# Patient Record
Sex: FEMALE | Race: WHITE | URBAN - METROPOLITAN AREA
[De-identification: names, ages, dates, MRNs, and addresses within clinical notes are randomized per-mention and may not be internally consistent; named-entity substitution may affect disease eponyms.]

---

## 2023-04-25 ENCOUNTER — OFFICE VISIT (OUTPATIENT)
Dept: NEUROLOGY | Age: 53
End: 2023-04-25
Payer: COMMERCIAL

## 2023-04-25 DIAGNOSIS — R56.9 SEIZURE (HCC): Primary | ICD-10-CM

## 2023-04-25 PROCEDURE — 95816 EEG AWAKE AND DROWSY: CPT | Performed by: PSYCHIATRY & NEUROLOGY

## 2023-04-25 NOTE — PROCEDURES
[facility] AT [place]   Electroencephalogram Report    Procedure ID: [id number] Procedure Date: 4/25/2023   Patient Name: Leanna Robertson YOB: 1970   Procedure Type: Sleep Deprived Medical Record No: [id number]       A sleep deprived EEG is requested in this 49-year-old lady to evaluate for epileptiform abnormalities. Her medications are said to include Trileptal.    This tracing is obtained during the awake and drowsy states. During wakefulness there are intermittent runs of posteriorly dominant and symmetric low to medium amplitude 10 cps activities which attenuate with eye opening. Lower voltage faster frequency activities are seen symmetrically over the anterior head regions. Hyperventilation is performed for 3 minutes and little alters the tracing. Intermittent photic stimulation induces symmetric posterior driving responses. During drowsiness the background rhythms attenuate and replaced with diffuse symmetric theta range activities. There is the later emergence of symmetric vertex sharp waves. Later stages of sleep were not attained.     Interpretation  This sleep deprived EEG recorded during the awake and drowsy states is normal.      Sasha Espinoza MD

## 2023-05-15 ENCOUNTER — OFFICE VISIT (OUTPATIENT)
Age: 53
End: 2023-05-15
Payer: COMMERCIAL

## 2023-05-15 VITALS — OXYGEN SATURATION: 98 % | SYSTOLIC BLOOD PRESSURE: 136 MMHG | HEART RATE: 68 BPM | DIASTOLIC BLOOD PRESSURE: 84 MMHG

## 2023-05-15 DIAGNOSIS — R56.9 CONVULSIONS, UNSPECIFIED CONVULSION TYPE (HCC): Primary | ICD-10-CM

## 2023-05-15 DIAGNOSIS — F41.9 ANXIETY: ICD-10-CM

## 2023-05-15 PROCEDURE — 99214 OFFICE O/P EST MOD 30 MIN: CPT | Performed by: NURSE PRACTITIONER

## 2023-05-15 NOTE — PROGRESS NOTES
Jadiel Corral is a 45 y.o. female who presents with the following  Chief Complaint   Patient presents with    Follow-up    Results       HPI    FU SD EEG   Doing well   Off Trileptal X 1 month now   Feels a lot better  Memory is better  Cognitive ability better  They feel she is becoming herself again   No seizures, jerking, twitching, convulsions  Notes from [facility] evaluated and placed in media. No concerns  Does want to get in with come counseling. No Known Allergies    No current outpatient medications on file. No current facility-administered medications for this visit. Social History     Tobacco Use   Smoking Status Not on file   Smokeless Tobacco Not on file       No past medical history on file. No past surgical history on file. No family history on file. Social History     Socioeconomic History    Marital status: Single       Review of Systems      Remainder of comprehensive review is negative. Physical Exam :    /84 (Site: Left Upper Arm, Position: Sitting, Cuff Size: Large Adult)   Pulse 68   SpO2 98%   MRI Result (most recent):  No results found for this or any previous visit from the past 3650 days. CT Result (most recent):  No results found for this or any previous visit from the past 3650 days. EEG Result:      Carotid Doppler:        Recent Labs:  No results found for: WBC, HGB, HCT, MCV, PLT  No results found for: NA, K, CL, CO2, BUN, CREATININE, GLUCOSE, CALCIUM, PROT, LABALBU, BILITOT, ALKPHOS, AST, ALT, LABGLOM, GFRAA, AGRATIO, GLOB    No results found for: CHOL  No results found for: TRIG  No results found for: HDL  No results found for: LDLCHOLESTEROL, LDLCALC  No results found for: LABVLDL, VLDL  No results found for: CHOLHDLRATIO    No results found for: SEDRATE    No results found for: LABA1C  No results found for: SAMY             1. Convulsions, unspecified convulsion type [facility])  -     External Referral To Psychology  2.

## 2023-05-15 NOTE — PROGRESS NOTES
Pt has been doing really well, seizure free  Hasnt been on any meds since SD EEG  Asking for ref with psychiatrist, discussed with [place]

## 2023-05-17 ENCOUNTER — TELEPHONE (OUTPATIENT)
Age: 53
End: 2023-05-17

## 2023-05-17 NOTE — TELEPHONE ENCOUNTER
LVM, advised pt to contact insurance company to find out which psychiatrist is within their network and she may schedule with them.

## 2023-05-17 NOTE — TELEPHONE ENCOUNTER
Patient stated that at her last visit looking at her summary paperwork the referral did not have the Psychologist information and she would like to make her appointment.     Please contact

## 2023-05-19 ENCOUNTER — TELEPHONE (OUTPATIENT)
Age: 53
End: 2023-05-19

## 2023-08-30 ENCOUNTER — OFFICE VISIT (OUTPATIENT)
Age: 53
End: 2023-08-30
Payer: COMMERCIAL

## 2023-08-30 VITALS
DIASTOLIC BLOOD PRESSURE: 64 MMHG | SYSTOLIC BLOOD PRESSURE: 124 MMHG | RESPIRATION RATE: 16 BRPM | OXYGEN SATURATION: 93 % | HEART RATE: 72 BPM

## 2023-08-30 DIAGNOSIS — R56.9 SEIZURE (HCC): ICD-10-CM

## 2023-08-30 DIAGNOSIS — R44.0 AUDITORY HALLUCINATION: Primary | ICD-10-CM

## 2023-08-30 PROCEDURE — 99213 OFFICE O/P EST LOW 20 MIN: CPT | Performed by: PSYCHIATRY & NEUROLOGY

## 2023-08-30 RX ORDER — LEVONORGESTREL 52 MG/1
INTRAUTERINE DEVICE INTRAUTERINE
COMMUNITY

## 2023-08-30 NOTE — PROGRESS NOTES
upset that this is happening. She just wants to understand why and then address it. Examination  /64   Pulse 72   Resp 16   SpO2 93%   She is a pleasant engaging lady. She is tearful. No ataxia    Impression/Plan  Isolated seizure without recurrence  Continue to monitor off antiseizure medications  New issue of auditory hallucination versus just misperception of normal sounds that she is hearing which are quite upsetting to her  She has a neuropsychological consultation set for January and we will try to see if we can expedite that in some fashion    Becky Jj MD        This note was created using voice recognition software. Despite editing, there may be syntax errors.

## 2023-09-18 ENCOUNTER — TELEMEDICINE (OUTPATIENT)
Age: 53
End: 2023-09-18

## 2023-09-18 DIAGNOSIS — R41.89 COGNITIVE CHANGE: Primary | ICD-10-CM

## 2023-09-27 ENCOUNTER — TELEMEDICINE (OUTPATIENT)
Age: 53
End: 2023-09-27
Payer: COMMERCIAL

## 2023-09-27 DIAGNOSIS — F41.9 ANXIETY: ICD-10-CM

## 2023-09-27 DIAGNOSIS — Z91.49 HISTORY OF PSYCHOLOGICAL TRAUMA: ICD-10-CM

## 2023-09-27 DIAGNOSIS — R47.89 WORD FINDING DIFFICULTY: ICD-10-CM

## 2023-09-27 DIAGNOSIS — R44.0 AUDITORY HALLUCINATIONS: ICD-10-CM

## 2023-09-27 DIAGNOSIS — R41.840 ATTENTION AND CONCENTRATION DEFICIT: ICD-10-CM

## 2023-09-27 DIAGNOSIS — R41.3 MEMORY LOSS: Primary | ICD-10-CM

## 2023-09-27 DIAGNOSIS — R41.9 DEFICIT IN COMPREHENSION: ICD-10-CM

## 2023-09-27 PROCEDURE — 90791 PSYCH DIAGNOSTIC EVALUATION: CPT | Performed by: PSYCHOLOGIST

## 2023-09-27 NOTE — PROGRESS NOTES
when appropriate. The patient was located at Home: [address] [place] [address].   [address] [facility] [address]  Provider was located at [facility] ([facility]): [place]

## 2023-10-19 SDOH — HEALTH STABILITY: PHYSICAL HEALTH: ON AVERAGE, HOW MANY MINUTES DO YOU ENGAGE IN EXERCISE AT THIS LEVEL?: 20 MIN

## 2023-10-19 SDOH — HEALTH STABILITY: PHYSICAL HEALTH: ON AVERAGE, HOW MANY DAYS PER WEEK DO YOU ENGAGE IN MODERATE TO STRENUOUS EXERCISE (LIKE A BRISK WALK)?: 3 DAYS

## 2023-10-20 ENCOUNTER — OFFICE VISIT (OUTPATIENT)
Age: 53
End: 2023-10-20
Payer: COMMERCIAL

## 2023-10-20 VITALS
HEIGHT: 62 IN | WEIGHT: 205.13 LBS | BODY MASS INDEX: 37.75 KG/M2 | HEART RATE: 62 BPM | DIASTOLIC BLOOD PRESSURE: 77 MMHG | SYSTOLIC BLOOD PRESSURE: 127 MMHG | OXYGEN SATURATION: 97 %

## 2023-10-20 DIAGNOSIS — F41.9 ANXIETY: ICD-10-CM

## 2023-10-20 DIAGNOSIS — F43.10 PTSD (POST-TRAUMATIC STRESS DISORDER): ICD-10-CM

## 2023-10-20 DIAGNOSIS — N92.0 MENORRHAGIA WITH REGULAR CYCLE: ICD-10-CM

## 2023-10-20 DIAGNOSIS — R56.9 SEIZURE (HCC): Primary | ICD-10-CM

## 2023-10-20 PROCEDURE — 99203 OFFICE O/P NEW LOW 30 MIN: CPT | Performed by: STUDENT IN AN ORGANIZED HEALTH CARE EDUCATION/TRAINING PROGRAM

## 2023-10-20 SDOH — ECONOMIC STABILITY: FOOD INSECURITY: WITHIN THE PAST 12 MONTHS, THE FOOD YOU BOUGHT JUST DIDN'T LAST AND YOU DIDN'T HAVE MONEY TO GET MORE.: NEVER TRUE

## 2023-10-20 SDOH — ECONOMIC STABILITY: FOOD INSECURITY: WITHIN THE PAST 12 MONTHS, YOU WORRIED THAT YOUR FOOD WOULD RUN OUT BEFORE YOU GOT MONEY TO BUY MORE.: NEVER TRUE

## 2023-10-20 SDOH — ECONOMIC STABILITY: INCOME INSECURITY: HOW HARD IS IT FOR YOU TO PAY FOR THE VERY BASICS LIKE FOOD, HOUSING, MEDICAL CARE, AND HEATING?: NOT HARD AT ALL

## 2023-10-20 SDOH — ECONOMIC STABILITY: HOUSING INSECURITY
IN THE LAST 12 MONTHS, WAS THERE A TIME WHEN YOU DID NOT HAVE A STEADY PLACE TO SLEEP OR SLEPT IN A SHELTER (INCLUDING NOW)?: NO

## 2023-11-17 ENCOUNTER — PROCEDURE VISIT (OUTPATIENT)
Age: 53
End: 2023-11-17
Payer: COMMERCIAL

## 2023-11-17 DIAGNOSIS — F43.10 PTSD (POST-TRAUMATIC STRESS DISORDER): ICD-10-CM

## 2023-11-17 DIAGNOSIS — F41.8 ANXIETY ABOUT HEALTH: ICD-10-CM

## 2023-11-17 DIAGNOSIS — H93.25 AUDITORY PROCESSING DISORDER: Primary | ICD-10-CM

## 2023-11-17 DIAGNOSIS — F41.9 ANXIETY DISORDER, UNSPECIFIED TYPE: ICD-10-CM

## 2023-11-17 DIAGNOSIS — F34.1 PERSISTENT DEPRESSIVE DISORDER: ICD-10-CM

## 2023-11-17 PROCEDURE — 96138 PSYCL/NRPSYC TECH 1ST: CPT | Performed by: PSYCHOLOGIST

## 2023-11-17 PROCEDURE — 96136 PSYCL/NRPSYC TST PHY/QHP 1ST: CPT | Performed by: PSYCHOLOGIST

## 2023-11-17 PROCEDURE — 96133 NRPSYC TST EVAL PHYS/QHP EA: CPT | Performed by: PSYCHOLOGIST

## 2023-11-17 PROCEDURE — 96139 PSYCL/NRPSYC TST TECH EA: CPT | Performed by: PSYCHOLOGIST

## 2023-11-17 PROCEDURE — 96137 PSYCL/NRPSYC TST PHY/QHP EA: CPT | Performed by: PSYCHOLOGIST

## 2023-11-17 PROCEDURE — 96132 NRPSYC TST EVAL PHYS/QHP 1ST: CPT | Performed by: PSYCHOLOGIST

## 2023-11-17 NOTE — PROGRESS NOTES
[facility] Neurology  [facility] [address], [address]  Office:  [phone]  Fax: [phone]                  Neuropsychological Evaluation Report    Patient Name: Brie Daniel  Age: 45 y.o. Gender: female   Handedness: left handed   Presenting Concern: memory loss, attention and concentration deficits, word finding, comprehension; anxiety   Primary Care Physician: Ashley Lopez MD  Referring Provider: Rosio Yepez MD    PATIENT HISTORY (OBTAINED DURING INITIAL CLINICAL EVALUATION):    REASON FOR REFERRAL:  This comprehensive and medically necessary neuropsychological assessment was requested to assist a differential diagnosis of cognitive and psychological concerns. The use and purpose of this examination, as well as the extent and limitations of confidentiality, were explained prior to obtaining permission to participate. Instructions were provided regarding the necessity to put forth optimal effort and answer questions truthfully in order to obtain reliable and accurate test results. REVIEW OF RECORDS:  Ms. Hussain Loza was referred by neurology where she is followed for a seizure which occurred on 4/2/22. She was said to have an abnormal EEG and was diagnosed with left-sided temporal lobe epilepsy. A sleep deprived EEG was unremarkable. A neuropsychological evaluation has been requested due to auditory hallucinations. For example, Ms. Hussain Loza hears the toilet flushing when no one is home. Anxiety is also noted. An MRI of the brain on 5/27/22, ordered for seizure disorder, showed the following:  No acute intracranial process. No evidence for mesial temporal   sclerosis, cortical dysplasia, or other neuronal migrational   abnormalities. Current Outpatient Medications   Medication Sig Dispense Refill    levonorgestrel (MIRENA, 52 MG,) IUD 52 mg by IntraUTERine route       No current facility-administered medications for this visit.                No data to display

## 2023-11-27 NOTE — PROGRESS NOTES
Interactive Psychotherapy/office feedback        Interactive office feedback session with Ms. Silvio Padilla. I reviewed the results of the recent Neuropsychological Evaluation  including the observed areas of neurocognitive strengths and weaknesses. Education was provided regarding my diagnostic impressions, and treatment plan/options were discussed. I also answered numerous questions related to the clinical findings, including the various methods to improve cognition and mood. CBT, psychoeducation, and supportive psychotherapy techniques were utilized. [facility]'s report [facility] per patient request.         Prior to seeing the patient I reviewed the records, including the previously completed report, the records in [place], and any updated visits from other providers since I saw the patient last.       Diagnoses:      R/O Auditory Processing Disorder versus ADHD versus Other  PTSD  Persistent Depressive Disorder  Anxiety about health R/O Somatization   Anxiety Disorder, unspecified R/O Generalized Anxiety Disorder   History of Panic Attacks  Psychosocial Stress     The patient will follow up with the referring provider, and reported being very pleased with the services provided. Follow up with [facility] 12 months. [id number] psychotherapy 30 Minutes      This note was created using voice recognition software. Despite editing, there may be syntax errors. Harsh Calderon, was evaluated through a synchronous (real-time) audio-video encounter. The patient (or guardian if applicable) is aware that this is a billable service, which includes applicable co-pays. This Virtual Visit was conducted with patient's (and/or legal guardian's) consent. Patient identification was verified, and a caregiver was present when appropriate. The patient was located at Home: [address] [place] [address].   [address] [id number]  Provider was located at [facility] ([facility]): [address] [place] [address]

## 2023-12-01 ENCOUNTER — TELEMEDICINE (OUTPATIENT)
Age: 53
End: 2023-12-01

## 2023-12-01 DIAGNOSIS — Z65.8 PSYCHOSOCIAL DISTRESS: ICD-10-CM

## 2023-12-01 DIAGNOSIS — F41.9 ANXIETY DISORDER, UNSPECIFIED TYPE: ICD-10-CM

## 2023-12-01 DIAGNOSIS — F41.8 ANXIETY ABOUT HEALTH: ICD-10-CM

## 2023-12-01 DIAGNOSIS — F43.10 PTSD (POST-TRAUMATIC STRESS DISORDER): ICD-10-CM

## 2023-12-01 DIAGNOSIS — Z86.59 HISTORY OF PANIC ATTACKS: ICD-10-CM

## 2023-12-01 DIAGNOSIS — H93.25 AUDITORY PROCESSING DISORDER: Primary | ICD-10-CM

## 2023-12-01 DIAGNOSIS — F34.1 PERSISTENT DEPRESSIVE DISORDER: ICD-10-CM

## 2024-01-08 ENCOUNTER — OFFICE VISIT (OUTPATIENT)
Age: 54
End: 2024-01-08
Payer: COMMERCIAL

## 2024-01-08 VITALS
RESPIRATION RATE: 18 BRPM | HEART RATE: 70 BPM | BODY MASS INDEX: 38.99 KG/M2 | SYSTOLIC BLOOD PRESSURE: 136 MMHG | OXYGEN SATURATION: 96 % | DIASTOLIC BLOOD PRESSURE: 73 MMHG | WEIGHT: 211.86 LBS | HEIGHT: 62 IN | TEMPERATURE: 98.2 F

## 2024-01-08 DIAGNOSIS — L24.9 IRRITANT DERMATITIS: ICD-10-CM

## 2024-01-08 DIAGNOSIS — F41.9 ANXIETY: Primary | ICD-10-CM

## 2024-01-08 PROCEDURE — 99213 OFFICE O/P EST LOW 20 MIN: CPT

## 2024-01-08 RX ORDER — BUSPIRONE HYDROCHLORIDE 5 MG/1
5 TABLET ORAL 2 TIMES DAILY
Qty: 60 TABLET | Refills: 0 | Status: SHIPPED | OUTPATIENT
Start: 2024-01-08 | End: 2024-02-07

## 2024-01-08 ASSESSMENT — ANXIETY QUESTIONNAIRES
7. FEELING AFRAID AS IF SOMETHING AWFUL MIGHT HAPPEN: 3
1. FEELING NERVOUS, ANXIOUS, OR ON EDGE: 3
6. BECOMING EASILY ANNOYED OR IRRITABLE: 2
4. TROUBLE RELAXING: 3
5. BEING SO RESTLESS THAT IT IS HARD TO SIT STILL: 2
GAD7 TOTAL SCORE: 19
3. WORRYING TOO MUCH ABOUT DIFFERENT THINGS: 3
IF YOU CHECKED OFF ANY PROBLEMS ON THIS QUESTIONNAIRE, HOW DIFFICULT HAVE THESE PROBLEMS MADE IT FOR YOU TO DO YOUR WORK, TAKE CARE OF THINGS AT HOME, OR GET ALONG WITH OTHER PEOPLE: SOMEWHAT DIFFICULT
2. NOT BEING ABLE TO STOP OR CONTROL WORRYING: 3

## 2024-01-08 ASSESSMENT — PATIENT HEALTH QUESTIONNAIRE - PHQ9
SUM OF ALL RESPONSES TO PHQ9 QUESTIONS 1 & 2: 2
7. TROUBLE CONCENTRATING ON THINGS, SUCH AS READING THE NEWSPAPER OR WATCHING TELEVISION: 1
6. FEELING BAD ABOUT YOURSELF - OR THAT YOU ARE A FAILURE OR HAVE LET YOURSELF OR YOUR FAMILY DOWN: 2
2. FEELING DOWN, DEPRESSED OR HOPELESS: 1
3. TROUBLE FALLING OR STAYING ASLEEP: 2
10. IF YOU CHECKED OFF ANY PROBLEMS, HOW DIFFICULT HAVE THESE PROBLEMS MADE IT FOR YOU TO DO YOUR WORK, TAKE CARE OF THINGS AT HOME, OR GET ALONG WITH OTHER PEOPLE: 2
SUM OF ALL RESPONSES TO PHQ QUESTIONS 1-9: 10
5. POOR APPETITE OR OVEREATING: 1
SUM OF ALL RESPONSES TO PHQ QUESTIONS 1-9: 10
4. FEELING TIRED OR HAVING LITTLE ENERGY: 2
9. THOUGHTS THAT YOU WOULD BE BETTER OFF DEAD, OR OF HURTING YOURSELF: 0
SUM OF ALL RESPONSES TO PHQ QUESTIONS 1-9: 10
8. MOVING OR SPEAKING SO SLOWLY THAT OTHER PEOPLE COULD HAVE NOTICED. OR THE OPPOSITE, BEING SO FIGETY OR RESTLESS THAT YOU HAVE BEEN MOVING AROUND A LOT MORE THAN USUAL: 0
1. LITTLE INTEREST OR PLEASURE IN DOING THINGS: 1
SUM OF ALL RESPONSES TO PHQ QUESTIONS 1-9: 10

## 2024-01-08 NOTE — PROGRESS NOTES
[address] [place] [address]   Office [phone], Fax [phone]  Chief Complaint   Patient presents with    Follow-up      Subjective   Neda Canela is an 53 y.o. female who presents for follow up of anxiety/depression. Started on Buspar at last visit. Reports improvement in mind racing and some somatic symptom improvement. Still has upper back tension. Her partner tells her she still has anxiety 'moments' but they have been less intense and less frequent. Feels like the effects wear off around 4-5pm. Still doing weekly counseling. This has been helping.     She notes a rash that developed on her neck and chest 1 week after starting Buspar but states that is has somewhat improved. She also recently tried several new perfumes and lotions that she was gifted so she is trying to cut these things out of her routine.    Review of Systems   Review of Systems See HPI    Allergies   No Known Allergies    Medications  Current Outpatient Medications   Medication Sig    busPIRone (BUSPAR) 5 MG tablet Take 1 tablet by mouth 2 times daily    levonorgestrel (MIRENA, 52 MG,) IUD 52 mg by IntraUTERine route     No current facility-administered medications for this visit.       Medical History  Past Medical History:   Diagnosis Date    Anxiety     Previously tried medications (zoloft, wellbutrin, and other ones) but they made her more deppressed and suicidal    Gestational diabetes 1998    Heavy menstrual bleeding 1998    Currently on Mirena, which helps a lot, due to come out on 2025, establishing with OBGYN next month    PTSD (post-traumatic stress disorder)     Related to unfortunate relationships. Currently getting counseling (Alfred Padilla) which she finds more helpful than medication    Seizure (HCC) 2022    Follows with Dr. Boykin, was previously on trileptal but not currently on any medications       Immunizations     There is no immunization history on file for this patient.    Objective   Vital

## 2024-01-08 NOTE — PROGRESS NOTES
Rash after a week of Buspar    Identified pt with two pt identifiers(name and ). Reviewed record in preparation for visit and have obtained necessary documentation.  Chief Complaint   Patient presents with    Follow-up        Health Maintenance Due   Topic    Hepatitis B vaccine (1 of 3 - 3-dose series)    COVID-19 Vaccine (1)    Depression Monitoring     HIV screen     Hepatitis C screen     DTaP/Tdap/Td vaccine (1 - Tdap)    Cervical cancer screen     Diabetes screen     Lipids     Colorectal Cancer Screen     Breast cancer screen     Shingles vaccine (1 of 2)    Low dose CT lung screening &/or counseling     Flu vaccine (1)       Vitals:    24 1456 24 1503   BP: (!) 142/78 136/73   Site: Right Upper Arm Left Upper Arm   Position: Sitting Sitting   Cuff Size: Large Adult Large Adult   Pulse: 70    Resp: 18    Temp: 98.2 °F (36.8 °C)    TempSrc: Oral    SpO2: 96%    Weight: 96.1 kg (211 lb 13.8 oz)    Height: 1.575 m (5' 2\")            Coordination of Care Questionnaire:  :   1. Have you been to the ER, urgent care clinic since your last visit?  Hospitalized since your last visit?No    2. Have you seen or consulted any other health care providers outside of the [facility] since your last visit?  Include any pap smears or colon screening. No    This patient is accompanied in the office by her self.  I have received verbal consent from Neda Canela to discuss any/all medical information while they are present in the room.

## 2024-02-06 ENCOUNTER — OFFICE VISIT (OUTPATIENT)
Age: 54
End: 2024-02-06
Payer: COMMERCIAL

## 2024-02-06 VITALS
WEIGHT: 211 LBS | BODY MASS INDEX: 38.83 KG/M2 | SYSTOLIC BLOOD PRESSURE: 137 MMHG | HEART RATE: 78 BPM | DIASTOLIC BLOOD PRESSURE: 74 MMHG | HEIGHT: 62 IN | OXYGEN SATURATION: 95 % | RESPIRATION RATE: 16 BRPM | TEMPERATURE: 97.7 F

## 2024-02-06 DIAGNOSIS — L70.0 ACNE VULGARIS: ICD-10-CM

## 2024-02-06 DIAGNOSIS — F43.10 PTSD (POST-TRAUMATIC STRESS DISORDER): ICD-10-CM

## 2024-02-06 DIAGNOSIS — F41.8 ANXIETY WITH SOMATIC FEATURES: Primary | ICD-10-CM

## 2024-02-06 PROCEDURE — 99214 OFFICE O/P EST MOD 30 MIN: CPT

## 2024-02-06 RX ORDER — BUSPIRONE HYDROCHLORIDE 7.5 MG/1
7.5 TABLET ORAL 2 TIMES DAILY
Qty: 60 TABLET | Refills: 0 | Status: SHIPPED | OUTPATIENT
Start: 2024-02-06 | End: 2024-03-07

## 2024-02-06 RX ORDER — BUSPIRONE HYDROCHLORIDE 5 MG/1
5 TABLET ORAL 2 TIMES DAILY
Qty: 60 TABLET | Refills: 0 | Status: CANCELLED | OUTPATIENT
Start: 2024-02-06 | End: 2024-03-07

## 2024-02-06 ASSESSMENT — PATIENT HEALTH QUESTIONNAIRE - PHQ9
8. MOVING OR SPEAKING SO SLOWLY THAT OTHER PEOPLE COULD HAVE NOTICED. OR THE OPPOSITE, BEING SO FIGETY OR RESTLESS THAT YOU HAVE BEEN MOVING AROUND A LOT MORE THAN USUAL: 0
SUM OF ALL RESPONSES TO PHQ QUESTIONS 1-9: 8
6. FEELING BAD ABOUT YOURSELF - OR THAT YOU ARE A FAILURE OR HAVE LET YOURSELF OR YOUR FAMILY DOWN: 1
1. LITTLE INTEREST OR PLEASURE IN DOING THINGS: 1
4. FEELING TIRED OR HAVING LITTLE ENERGY: 1
7. TROUBLE CONCENTRATING ON THINGS, SUCH AS READING THE NEWSPAPER OR WATCHING TELEVISION: 2
9. THOUGHTS THAT YOU WOULD BE BETTER OFF DEAD, OR OF HURTING YOURSELF: 0
SUM OF ALL RESPONSES TO PHQ QUESTIONS 1-9: 8
SUM OF ALL RESPONSES TO PHQ9 QUESTIONS 1 & 2: 2
2. FEELING DOWN, DEPRESSED OR HOPELESS: 1
5. POOR APPETITE OR OVEREATING: 1
3. TROUBLE FALLING OR STAYING ASLEEP: 1
10. IF YOU CHECKED OFF ANY PROBLEMS, HOW DIFFICULT HAVE THESE PROBLEMS MADE IT FOR YOU TO DO YOUR WORK, TAKE CARE OF THINGS AT HOME, OR GET ALONG WITH OTHER PEOPLE: 0

## 2024-02-06 ASSESSMENT — ANXIETY QUESTIONNAIRES
5. BEING SO RESTLESS THAT IT IS HARD TO SIT STILL: 1
2. NOT BEING ABLE TO STOP OR CONTROL WORRYING: 2
3. WORRYING TOO MUCH ABOUT DIFFERENT THINGS: 2
1. FEELING NERVOUS, ANXIOUS, OR ON EDGE: 1
4. TROUBLE RELAXING: 1
6. BECOMING EASILY ANNOYED OR IRRITABLE: 0
7. FEELING AFRAID AS IF SOMETHING AWFUL MIGHT HAPPEN: 1
GAD7 TOTAL SCORE: 8
IF YOU CHECKED OFF ANY PROBLEMS ON THIS QUESTIONNAIRE, HOW DIFFICULT HAVE THESE PROBLEMS MADE IT FOR YOU TO DO YOUR WORK, TAKE CARE OF THINGS AT HOME, OR GET ALONG WITH OTHER PEOPLE: SOMEWHAT DIFFICULT

## 2024-02-06 NOTE — PROGRESS NOTES
Patient has been identified by name and .    Chief Complaint   Patient presents with    Anxiety     Follow up       Vitals:    24 1307   BP: 137/74   Site: Right Upper Arm   Position: Sitting   Cuff Size: Large Adult   Pulse: 78   Resp: 16   Temp: 97.7 °F (36.5 °C)   TempSrc: Oral   SpO2: 95%   Weight: 95.7 kg (211 lb)   Height: 1.575 m (5' 2\")        1. Have you been to the ER, urgent care clinic since your last visit?  Hospitalized since your last visit?No    2. Have you seen or consulted any other health care providers outside of the [facility] since your last visit?  Include any pap smears or colon screening. No

## 2024-02-06 NOTE — PROGRESS NOTES
[address] [place] [address]   Office [phone], Fax [phone]  Chief Complaint   Patient presents with    Anxiety     Follow up      Subjective   Neda Canela is an 53 y.o. female who presents for anxiety follow up. Patient reports she is doing better. Continuing Buspar and has not had any issues. Still has mind racing but feels frequency of \"intense moments\" have decreased. Working with therapist to redirect thoughts when her PTSD is triggered. Somatic symptoms/ flank pain has improved. Now feels like she may be able to go back to work at some point. Interested in increasing Buspar dose.     Recently having acne on face. So far has tried salicylic acid face wash. Had hormonal testing done at her GYN and everything was normal.     Review of Systems   Review of Systems See HPI    Allergies   No Known Allergies    Medications  Current Outpatient Medications   Medication Sig    busPIRone (BUSPAR) 7.5 MG tablet Take 1 tablet by mouth 2 times daily    levonorgestrel (MIRENA, 52 MG,) IUD 52 mg by IntraUTERine route     No current facility-administered medications for this visit.       Medical History  Past Medical History:   Diagnosis Date    Anxiety     Previously tried medications (zoloft, wellbutrin, and other ones) but they made her more deppressed and suicidal    Gestational diabetes 1998    Heavy menstrual bleeding 1998    Currently on Mirena, which helps a lot, due to come out on 2025, establishing with OBGYN next month    PTSD (post-traumatic stress disorder)     Related to unfortunate relationships. Currently getting counseling (Alfred Padilla) which she finds more helpful than medication    Seizure (HCC) 2022    Follows with Dr. Boykin, was previously on trileptal but not currently on any medications       Immunizations     There is no immunization history on file for this patient.    Objective   Vital Signs  /74 (Site: Right Upper Arm, Position: Sitting, Cuff Size: Large Adult)

## 2024-02-13 ENCOUNTER — TELEPHONE (OUTPATIENT)
Age: 54
End: 2024-02-13

## 2024-02-13 RX ORDER — BUSPIRONE HYDROCHLORIDE 5 MG/1
7.5 TABLET ORAL 2 TIMES DAILY
Qty: 90 TABLET | Refills: 2 | Status: SHIPPED | OUTPATIENT
Start: 2024-02-13 | End: 2024-05-13

## 2024-02-13 NOTE — TELEPHONE ENCOUNTER
Pt stated that that Pharmacist informed her that her insurance will not cover 7.5 mg and that several faxes have been sent to you with the information; however, they have not received a response. Pt is requesting that you contact the pharmacy to address this matter.    She would like to receive updates and gave verbal permission to leave voice messages.    Thank you

## 2024-02-26 ENCOUNTER — OFFICE VISIT (OUTPATIENT)
Age: 54
End: 2024-02-26
Payer: COMMERCIAL

## 2024-02-26 VITALS
OXYGEN SATURATION: 96 % | SYSTOLIC BLOOD PRESSURE: 138 MMHG | BODY MASS INDEX: 39.38 KG/M2 | WEIGHT: 214 LBS | DIASTOLIC BLOOD PRESSURE: 75 MMHG | RESPIRATION RATE: 16 BRPM | HEIGHT: 62 IN | TEMPERATURE: 98.1 F | HEART RATE: 70 BPM

## 2024-02-26 DIAGNOSIS — J02.9 SORE THROAT: Primary | ICD-10-CM

## 2024-02-26 PROCEDURE — 99213 OFFICE O/P EST LOW 20 MIN: CPT

## 2024-02-26 ASSESSMENT — PATIENT HEALTH QUESTIONNAIRE - PHQ9
SUM OF ALL RESPONSES TO PHQ QUESTIONS 1-9: 2
SUM OF ALL RESPONSES TO PHQ QUESTIONS 1-9: 2
2. FEELING DOWN, DEPRESSED OR HOPELESS: 1
SUM OF ALL RESPONSES TO PHQ QUESTIONS 1-9: 2
SUM OF ALL RESPONSES TO PHQ9 QUESTIONS 1 & 2: 2
SUM OF ALL RESPONSES TO PHQ QUESTIONS 1-9: 2

## 2024-02-26 NOTE — PATIENT INSTRUCTIONS
- Advised on the need to stay well hydrated and that symptoms can last up to 1.5 weeks with an additional 3 days on average for smokers  - Can use flonase nasal spray for nasal congestion along with a neti-pot for nasal irrigation (must use DISTILLED WATER)  - Can use tylenol/motrin as needed for generalized muscle pain and fever  - Will give Mucinex for cough, may also try Tea with honey  - Chloraseptic throat losenges and saline gargles (1 tsp salt in 8 oz water) for sore   throat  - Wash hand frequently and cough/sneeze into your sleeve to help prevent   infection of others

## 2024-02-26 NOTE — PROGRESS NOTES
Chief Complaint/Subjective:   HPI:  Neda Canela is a 53 y.o. female that presents for:   Chief Complaint   Patient presents with    Chest Congestion     Chest heaviness, sore throat since this morning and roommate tested positive for covid yesterday.     # URI-symptoms:  - Symptom onset: This AM (2/26)  - Context: Pt states her roommate tested positive for COVID yesterday (2/25). Pt did not test herself for COVID. Endorses chest heaviness and getting winded when walking up a flight of stairs along with a sore throat. No chest pain or tightness at rest.   - Key symptoms: Chest heaviness, sore throat, MARAVILLA  - Pertinent Medical Hx: No asthma/COPD, but is a former smoker  - COVID Vaccination status: J&J 2021  - Influenza Vaccination status: Not UTD  - PNA Vaccination status: did receive one, but unsure which kind  - Denies: fever, chills, dizziness, SOB at rest, otalgia, n/v, diaphoresis        ROS:   Elements of ROS in HPI above    Health Maintenance:  Health Maintenance Due   Topic Date Due    Hepatitis B vaccine (1 of 3 - 3-dose series) Never done    COVID-19 Vaccine (1) Never done    HIV screen  Never done    Hepatitis C screen  Never done    DTaP/Tdap/Td vaccine (1 - Tdap) Never done    Cervical cancer screen  Never done    Diabetes screen  Never done    Lipids  Never done    Colorectal Cancer Screen  Never done    Breast cancer screen  Never done    Shingles vaccine (1 of 2) Never done    Low dose CT lung screening &/or counseling  Never done    Flu vaccine (1) Never done        Past medical history, social history, and medications personally reviewed.  Past Medical History:   Diagnosis Date    Anxiety     Previously tried medications (zoloft, wellbutrin, and other ones) but they made her more deppressed and suicidal    Gestational diabetes 1998    Heavy menstrual bleeding 1998    Currently on Mirena, which helps a lot, due to come out on 2025, establishing with OBGYN next month    PTSD (post-traumatic stress

## 2024-02-26 NOTE — PROGRESS NOTES
Neda Canela is a 53 y.o. female      Chief Complaint   Patient presents with    Chest Congestion     Chest heaviness, sore throat since this morning and roommate tested positive for covid yesterday.       \"Have you been to the ER, urgent care clinic since your last visit?  Hospitalized since your last visit?\"    NO    “Have you seen or consulted any other health care providers outside of [facility] since your last visit?”    NO    “Have you had a colorectal cancer screening such as a colonoscopy/FIT/Cologuard?    YES - Type: Colonoscopy - Where: [place] Nurse/CMA to request most recent records if not in the chart      Have you had a mammogram?”   YES - Where: [facility] Nurse/CMA to request most recent records if not in the chart     “Have you had a pap smear?”    YES - Where: [facility] Nurse/CMA to request most recent records if not in the chart           Vitals:    02/26/24 0919   BP: 138/75   Site: Right Upper Arm   Position: Sitting   Cuff Size: Large Adult   Pulse: 70   Resp: 16   Temp: 98.1 °F (36.7 °C)   TempSrc: Oral   SpO2: 96%   Weight: 97.1 kg (214 lb)   Height: 1.575 m (5' 2.01\")           Health Maintenance Due   Topic Date Due    Hepatitis B vaccine (1 of 3 - 3-dose series) Never done    COVID-19 Vaccine (1) Never done    HIV screen  Never done    Hepatitis C screen  Never done    DTaP/Tdap/Td vaccine (1 - Tdap) Never done    Cervical cancer screen  Never done    Diabetes screen  Never done    Lipids  Never done    Colorectal Cancer Screen  Never done    Breast cancer screen  Never done    Shingles vaccine (1 of 2) Never done    Low dose CT lung screening &/or counseling  Never done    Flu vaccine (1) Never done       Medication Reconciliation Completed, changes notes. Please Update medication list.

## 2024-02-27 ENCOUNTER — TELEPHONE (OUTPATIENT)
Age: 54
End: 2024-02-27

## 2024-02-27 DIAGNOSIS — U07.1 COVID-19: Primary | ICD-10-CM

## 2024-02-27 NOTE — TELEPHONE ENCOUNTER
Pt stated that she took a covid test, as advised by you. She stated that the results read as positive. She asked that you send the Rx for Paxlovid to [facility] [id number] - [place], [place] - [facility] - P [phone] - F [phone]    Thank you

## 2024-02-29 NOTE — PROGRESS NOTES
I reviewed with the resident the medical history and the resident's findings on the physical examination.  I discussed with the resident the patient's diagnosis and concur with the plan.     Zenia Soto MD 2/29/2024

## 2024-03-05 ASSESSMENT — SLEEP AND FATIGUE QUESTIONNAIRES
DO YOU TAKE NAPS: YES
AVERAGE NUMBER OF SLEEP HOURS: 5
HOW LIKELY ARE YOU TO NOD OFF OR FALL ASLEEP WHILE SITTING INACTIVE IN A PUBLIC PLACE: WOULD NEVER DOZE
DO YOU GET TOO LITTLE SLEEP AT NIGHT: NO
DO YOU HAVE DIFFICULTY BEING AS ACTIVE AS YOU WANT TO BE IN THE EVENING BECAUSE YOU ARE SLEEPY OR TIRED: YES, LITTLE
WHAT TIME DO YOU USUALLY WAKE UP: 05:45
HOW LIKELY ARE YOU TO NOD OFF OR FALL ASLEEP WHILE SITTING AND READING: 0
ARE YOU BOTHERED BY WAKING UP TOO EARLY AND NOT BEING ABLE TO GET BACK TO SLEEP: IS NOT
ESS TOTAL SCORE: 4
HOW LIKELY ARE YOU TO NOD OFF OR FALL ASLEEP WHILE SITTING INACTIVE IN A PUBLIC PLACE: 0
HOW LONG DO YOU NAP: 45 MINUTES TO 1 HOUR
HOW LIKELY ARE YOU TO NOD OFF OR FALL ASLEEP WHEN YOU ARE A PASSENGER IN A CAR FOR AN HOUR WITHOUT A BREAK: SLIGHT CHANCE OF DOZING
HOW LIKELY ARE YOU TO NOD OFF OR FALL ASLEEP WHILE WATCHING TV: SLIGHT CHANCE OF DOZING
SELECT ANY OF THE FOLLOWING BEHAVIORS OBSERVED WHILE YOU ARE ASLEEP: LIGHT SNORING
DO YOU HAVE DIFFICULTY BEING AS ACTIVE AS YOU WANT TO BE IN THE MORNING BECAUSE YOU ARE SLEEPY OR TIRED: YES, LITTLE
DO YOU HAVE PROBLEMS WITH FREQUENT AWAKENINGS AT NIGHT: YES
DO YOU HAVE DIFFICULTY OPERATING A MOTOR VEHICLE FOR LONG DISTANCES (GREATER THAN 100 MILES) BECAUSE YOU BECOME SLEEPY: NO
DO YOU GENERALLY HAVE DIFFICULTY REMEMBERING THINGS BECAUSE YOU ARE SLEEPY OR TIRED: NO
HOW MANY NAPS DO YOU TAKE PER WEEK: 7
HOW LIKELY ARE YOU TO NOD OFF OR FALL ASLEEP IN A CAR, WHILE STOPPED FOR A FEW MINUTES IN TRAFFIC: 0
HOW LIKELY ARE YOU TO NOD OFF OR FALL ASLEEP WHILE SITTING AND TALKING TO SOMEONE: WOULD NEVER DOZE
ARE YOU BOTHERED BY WAKING UP TOO EARLY AND NOT BEING ABLE TO GET BACK TO SLEEP: NO
DO YOU HAVE DIFFICULTY CONCENTRATING ON THE THINGS YOU DO BECAUSE YOU ARE SLEEPY OR TIRED: NO
HOW LIKELY ARE YOU TO NOD OFF OR FALL ASLEEP WHILE SITTING AND READING: WOULD NEVER DOZE
FOSQ SCORE: 19
HOW LIKELY ARE YOU TO NOD OFF OR FALL ASLEEP WHILE SITTING QUIETLY AFTER LUNCH WITHOUT ALCOHOL: 0
DO YOU HAVE DIFFICULTY VISITING YOUR FAMILY OR FRIENDS IN THEIR HOME BECAUSE YOU BECOME SLEEPY OR TIRED: NO
HAS YOUR RELATIONSHIP WITH FAMILY, FRIENDS OR WORK COLLEAGUES BEEN AFFECTED BECAUSE YOU ARE SLEEPY OR TIRED: NO
HOW LIKELY ARE YOU TO NOD OFF OR FALL ASLEEP WHEN YOU ARE A PASSENGER IN A CAR FOR AN HOUR WITHOUT A BREAK: 1
NUMBER OF TIMES YOU WAKE PER NIGHT: 4
DO YOU WORK SHIFTS: NO
HAS YOUR MOOD BEEN AFFECTED BECAUSE YOU ARE SLEEPY OR TIRED: NO
DO YOU HAVE DIFFICULTY WATCHING A MOVIE OR VIDEO BECAUSE YOU BECOME SLEEPY OR TIRED: NO
HOW LIKELY ARE YOU TO NOD OFF OR FALL ASLEEP WHILE LYING DOWN TO REST IN THE AFTERNOON WHEN CIRCUMSTANCES PERMIT: 2
DO YOU HAVE DIFFICULTY OPERATING A MOTOR VEHICLE FOR SHORT DISTANCES (LESS THAN 100 MILES) BECAUSE YOU BECOME SLEEPY: NO
SELECT ANY OF THE FOLLOWING BEHAVIORS OBSERVED WHILE YOU ARE ASLEEP: KICKING WITH LEGS
AVERAGE NUMBER OF SLEEP HOURS: 5
HOW LIKELY ARE YOU TO NOD OFF OR FALL ASLEEP WHILE SITTING QUIETLY AFTER LUNCH WITHOUT ALCOHOL: WOULD NEVER DOZE
DO YOU GET TOO LITTLE SLEEP AT NIGHT: DOES NOT
HOW LIKELY ARE YOU TO NOD OFF OR FALL ASLEEP WHILE SITTING AND TALKING TO SOMEONE: 0
HOW LIKELY ARE YOU TO NOD OFF OR FALL ASLEEP WHILE WATCHING TV: 1
HOW LIKELY ARE YOU TO NOD OFF OR FALL ASLEEP WHILE LYING DOWN TO REST IN THE AFTERNOON WHEN CIRCUMSTANCES PERMIT: MODERATE CHANCE OF DOZING
HOW LIKELY ARE YOU TO NOD OFF OR FALL ASLEEP IN A CAR, WHILE STOPPED FOR A FEW MINUTES IN TRAFFIC: WOULD NEVER DOZE

## 2024-03-08 ENCOUNTER — OFFICE VISIT (OUTPATIENT)
Age: 54
End: 2024-03-08
Payer: COMMERCIAL

## 2024-03-08 VITALS
HEIGHT: 62 IN | HEART RATE: 63 BPM | DIASTOLIC BLOOD PRESSURE: 63 MMHG | OXYGEN SATURATION: 93 % | WEIGHT: 210.9 LBS | BODY MASS INDEX: 38.81 KG/M2 | SYSTOLIC BLOOD PRESSURE: 111 MMHG

## 2024-03-08 DIAGNOSIS — G47.33 OSA (OBSTRUCTIVE SLEEP APNEA): Primary | ICD-10-CM

## 2024-03-08 PROCEDURE — 99203 OFFICE O/P NEW LOW 30 MIN: CPT | Performed by: SPECIALIST

## 2024-03-08 NOTE — PROGRESS NOTES
Outpatient Medications:     busPIRone (BUSPAR) 5 MG tablet, Take 1.5 tablets by mouth 2 times daily, Disp: 90 tablet, Rfl: 2    levonorgestrel (MIRENA, 52 MG,) IUD 52 mg, by IntraUTERine route, Disp: , Rfl:      She  has a past medical history of Anxiety, Gestational diabetes, Heavy menstrual bleeding, PTSD (post-traumatic stress disorder), and Seizure ().    She  has a past surgical history that includes  section ( & ); Tubal ligation (); Tonsillectomy; and Cholecystectomy ().    She family history includes COPD in her father; Cancer in her mother; Diabetes in her sister; Heart Attack in her father; Heart Disease in her father; Hypertension in her sister.    She  reports that she quit smoking about 3 years ago. Her smoking use included cigarettes. She started smoking about 38 years ago. She has a 70.0 pack-year smoking history. She has never been exposed to tobacco smoke. She has never used smokeless tobacco. She reports that she does not currently use alcohol. She reports that she does not use drugs.     Review of Systems:  Review of Systems        Objective:   /63 (Site: Left Upper Arm, Position: Sitting, Cuff Size: Large Adult)   Pulse 63   Ht 1.575 m (5' 2\")   Wt 95.7 kg (210 lb 14.4 oz)   SpO2 93%   BMI 38.57 kg/m²   Body mass index is 38.57 kg/m².    General:   Conversant, cooperative       Oropharynx:   Mallampati score I, tongue normal       Neck:   No carotid bruits;     Chest/Lungs:  Clear on auscultation    CVS:  Normal rate, regular rhythm   Skin:  Warm to touch; no obvious rashes   Neuro:  Speech fluent, face symmetrical, tongue movement normal   Psych:  Normal affect,  normal countenance        Assessment:   1. ERIC (obstructive sleep apnea)  -     PAT - Home Sleep Test; Future     Potential REM related sleep disordered breathing given history/pattern of nocturnal awakening.  Untreated sleep apnea potentially can lower seizure threshold.      Plan:     * Patient

## 2024-03-11 ENCOUNTER — TELEPHONE (OUTPATIENT)
Age: 54
End: 2024-03-11

## 2024-03-11 DIAGNOSIS — R56.9 SEIZURE (HCC): Primary | ICD-10-CM

## 2024-03-11 RX ORDER — AMOXICILLIN AND CLAVULANATE POTASSIUM 875; 125 MG/1; MG/1
1 TABLET, FILM COATED ORAL 2 TIMES DAILY
COMMUNITY
Start: 2024-03-10

## 2024-03-11 RX ORDER — LEVETIRACETAM 500 MG/1
500 TABLET ORAL 2 TIMES DAILY
COMMUNITY
Start: 2024-03-10

## 2024-03-11 NOTE — TELEPHONE ENCOUNTER
S/w pt, REEG ordered and pt scheduled for 3/12/24 arrival 1530.  Will call pt with recs after EEG has been read

## 2024-03-11 NOTE — TELEPHONE ENCOUNTER
Patient is requesting a call back to possibly get a sooner appt. States she had a seizure this past seizure and ER gave her a 30 day supply of Keppra.      Please contact.

## 2024-03-11 NOTE — TELEPHONE ENCOUNTER
Pt states she was seen at  this weekend d/t breakthrough sz and was started on levetiracetam 500 mg BID and told to f/u with neurology.  They did not do EEG, but imaging showed sig sinus infection so they started amox as well.

## 2024-03-12 ENCOUNTER — PROCEDURE VISIT (OUTPATIENT)
Age: 54
End: 2024-03-12
Payer: COMMERCIAL

## 2024-03-12 DIAGNOSIS — R56.9 SEIZURE (HCC): Primary | ICD-10-CM

## 2024-03-12 PROCEDURE — 95819 EEG AWAKE AND ASLEEP: CPT | Performed by: PSYCHIATRY & NEUROLOGY

## 2024-03-18 NOTE — PROCEDURES
[facility]   Electroencephalogram Report    Procedure ID: [id number] Procedure Date: 3/12/2024   Patient Name: Neda Canela YOB: 1970   Procedure Type: Routine Medical Record No: [id number]       An EEG is requested in this 53-year-old lady to evaluate for epileptiform abnormalities.  Medication said to include Keppra and BuSpar    This tracing is obtained during the awake, drowsy, and sleeping states.    During wakefulness there are intermittent runs of posteriorly dominant and symmetric low to medium amplitude 9 cps activities which attenuate with eye opening.  Lower voltage faster frequency activities are seen symmetrically over the anterior head region.  Intermittently there is sharply contoured theta activity over the left temporal region with associated sharp waves.    Hyperventilation is attempted but discontinued secondary to cough    Intermittent photic stimulation little alters the tracing.    Stage II sleep is attained.    Interpretation  This EEG recorded during the awake, drowsy, and sleeping states is abnormal secondary to the focal slowing over the left temporal region indicative of a region of focal neuronal dysfunction nonspecific as to cause.  The aforementioned left temporal sharp waves are epileptogenic in nature.        Aline Boykin MD

## 2024-03-20 ENCOUNTER — TELEPHONE (OUTPATIENT)
Age: 54
End: 2024-03-20

## 2024-03-20 NOTE — TELEPHONE ENCOUNTER
Patient is requesting a call to possibly discuss her EMG results. Would like to see if she can get sooner appt than July if not able to discuss over the phone.    Please advise.

## 2024-03-26 ENCOUNTER — HOSPITAL ENCOUNTER (OUTPATIENT)
Facility: HOSPITAL | Age: 54
Discharge: HOME | End: 2024-03-29
Payer: COMMERCIAL

## 2024-03-26 ENCOUNTER — PROCEDURE VISIT (OUTPATIENT)
Age: 54
End: 2024-03-26

## 2024-03-26 DIAGNOSIS — G47.33 OSA (OBSTRUCTIVE SLEEP APNEA): Primary | ICD-10-CM

## 2024-03-26 DIAGNOSIS — G47.33 OSA (OBSTRUCTIVE SLEEP APNEA): ICD-10-CM

## 2024-03-26 PROCEDURE — 95800 SLP STDY UNATTENDED: CPT | Performed by: SPECIALIST

## 2024-03-26 NOTE — PROGRESS NOTES
[address]  [place] [address]  Tel.  [phone]  Fax. [phone] [address]  [place] [address]  Tel.  [phone]  Fax. [phone] [address] [address]  Tel.  [phone]  Fax. [phone]       S>Neda Canela is a 53 y.o. female seen today to receive a home sleep testing unit (HST).    Patient was educated on proper hookup and operation of the HST.  Instruction forms and documentation were reviewed and signed.  The patient demonstrated good understanding of the HST.    O>    There were no vitals taken for this visit.      A>  No diagnosis found.      P>  General information regarding operations and maintenance of the device was provided.  She was provided information on sleep apnea including coresponding risk factors and the importance of proper treatment.   Follow-up appointment was made to return the HST. She will be contacted once the results have been reviewed.  She was asked to contact our office for any problems regarding her home sleep test study.    WP [id number]

## 2024-04-01 ENCOUNTER — TELEPHONE (OUTPATIENT)
Age: 54
End: 2024-04-01

## 2024-04-01 ENCOUNTER — OFFICE VISIT (OUTPATIENT)
Age: 54
End: 2024-04-01
Payer: COMMERCIAL

## 2024-04-01 VITALS
DIASTOLIC BLOOD PRESSURE: 83 MMHG | OXYGEN SATURATION: 96 % | SYSTOLIC BLOOD PRESSURE: 130 MMHG | WEIGHT: 208 LBS | BODY MASS INDEX: 38.04 KG/M2 | HEART RATE: 73 BPM

## 2024-04-01 VITALS
OXYGEN SATURATION: 93 % | DIASTOLIC BLOOD PRESSURE: 61 MMHG | SYSTOLIC BLOOD PRESSURE: 132 MMHG | RESPIRATION RATE: 18 BRPM | HEART RATE: 72 BPM

## 2024-04-01 DIAGNOSIS — G40.109 TEMPORAL LOBE EPILEPSY (HCC): Primary | ICD-10-CM

## 2024-04-01 DIAGNOSIS — F41.8 ANXIETY WITH SOMATIC FEATURES: Primary | ICD-10-CM

## 2024-04-01 DIAGNOSIS — G40.109 TEMPORAL LOBE EPILEPSY (HCC): ICD-10-CM

## 2024-04-01 DIAGNOSIS — G47.33 OSA (OBSTRUCTIVE SLEEP APNEA): Primary | ICD-10-CM

## 2024-04-01 DIAGNOSIS — G40.109 TEMPORAL LOBE SEIZURE (HCC): ICD-10-CM

## 2024-04-01 DIAGNOSIS — J01.00 ACUTE NON-RECURRENT MAXILLARY SINUSITIS: ICD-10-CM

## 2024-04-01 PROCEDURE — 99214 OFFICE O/P EST MOD 30 MIN: CPT | Performed by: PSYCHIATRY & NEUROLOGY

## 2024-04-01 PROCEDURE — 99214 OFFICE O/P EST MOD 30 MIN: CPT

## 2024-04-01 RX ORDER — DIAZEPAM 7.5 MG/100UL
SPRAY NASAL
Qty: 6 EACH | Refills: 3 | Status: SHIPPED | OUTPATIENT
Start: 2024-04-01

## 2024-04-01 RX ORDER — ESLICARBAZEPINE ACETATE 600 MG/1
600 TABLET ORAL DAILY
Qty: 30 TABLET | Refills: 5 | Status: SHIPPED | OUTPATIENT
Start: 2024-04-01

## 2024-04-01 RX ORDER — BUSPIRONE HYDROCHLORIDE 10 MG/1
10 TABLET ORAL 2 TIMES DAILY
Qty: 60 TABLET | Refills: 2 | Status: SHIPPED | OUTPATIENT
Start: 2024-04-01 | End: 2024-06-30

## 2024-04-01 ASSESSMENT — PATIENT HEALTH QUESTIONNAIRE - PHQ9
SUM OF ALL RESPONSES TO PHQ QUESTIONS 1-9: 5
1. LITTLE INTEREST OR PLEASURE IN DOING THINGS: NEARLY EVERY DAY
SUM OF ALL RESPONSES TO PHQ QUESTIONS 1-9: 5
SUM OF ALL RESPONSES TO PHQ9 QUESTIONS 1 & 2: 5
SUM OF ALL RESPONSES TO PHQ QUESTIONS 1-9: 5
2. FEELING DOWN, DEPRESSED OR HOPELESS: MORE THAN HALF THE DAYS
SUM OF ALL RESPONSES TO PHQ QUESTIONS 1-9: 5

## 2024-04-01 NOTE — TELEPHONE ENCOUNTER
WatchPAT HSAT performed for potential sleep disordered breathing.  7 hours 34 minutes recorded in which 6 hours 50 minutes spent asleep with 24.4% of sleep in REM.  All sleep stages observed.  Patient slept supine, right lateral and prone.    AHI 4.4/h (4% desaturation definition).  AHI 14.5/h (3% desaturation definition) with REM related AHI 18/h.  Prominent snoring noted.  Minimal SpO2 88%.    Impression: Sleep-disordered breathing (3% desaturation definition) with events more prominent in REM sleep.    Recommendation: APAP 6-16 cm    Sleep technologist: Please review study results with the patient.  Order has been entered for APAP 6-16 cm.  Please schedule compliance appointment.      Patient has a history of temporal lobe epilepsy.  Untreated sleep apnea may lower seizure threshold.
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
## 2024-04-01 NOTE — PROGRESS NOTES
[facility] and Neurodiagnostic Center at [facility] Neurology Clinics at [address] [place] [address] [address] [place] [address] [phone]              Chief Complaint   Patient presents with    Results     Current Outpatient Medications   Medication Sig Dispense Refill    levETIRAcetam (KEPPRA) 500 MG tablet Take 1 tablet by mouth 2 times daily      amoxicillin-clavulanate (AUGMENTIN) 875-125 MG per tablet Take 1 tablet by mouth 2 times daily      busPIRone (BUSPAR) 5 MG tablet Take 1.5 tablets by mouth 2 times daily 90 tablet 2    levonorgestrel (MIRENA, 52 MG,) IUD 52 mg by IntraUTERine route       No current facility-administered medications for this visit.      No Known Allergies  Social History     Tobacco Use    Smoking status: Former     Current packs/day: 0.00     Average packs/day: 2.0 packs/day for 35.0 years (70.0 ttl pk-yrs)     Types: Cigarettes     Start date: 1/1/1986     Quit date: 1/1/2021     Years since quitting: 3.2     Passive exposure: Never    Smokeless tobacco: Never    Tobacco comments:     33 yrs tobacco past 3 yrs vaping.   Vaping Use    Vaping Use: Every day    Start date: 1/1/2021    Substances: Nicotine, Flavoring    Devices: Disposable, Pre-filled or refillable cartridge, Pre-filled pod, No    Passive vaping exposure: Yes   Substance Use Topics    Alcohol use: Not Currently     Comment: 2-3 weekly    Drug use: Never     53-year-old lady following up today.  She had her last visit with me in August 2023.  She had a seizure April 2, 2022 with an abnormal EEG and she was diagnosed with left-sided temporal lobe epilepsy.  She was started on Zonegran and she reported side effects of that.  She was then on Trileptal very low-dose 150 mg nightly.  Sleep deprived EEG was unremarkable.  She saw nurse practitioner Kala May 2023 and she was off Trileptal doing well with no further

## 2024-04-02 ENCOUNTER — CLINICAL DOCUMENTATION (OUTPATIENT)
Age: 54
End: 2024-04-02

## 2024-04-02 NOTE — TELEPHONE ENCOUNTER
Re: Aptiom    Created case in [facility] key# [id number], awaiting outcome.        Re: Valtoco    Createdc case in [facility], Key# [id number], awaiting outcome.

## 2024-04-02 NOTE — PROGRESS NOTES
Received call from patient that [facility] extended a call to her and they are unable to fulfill her PAP order due to being out-of-network.    PAP Order routed to Lakeshia Carr at [facility] via [employer] today.    New DME information sent to patient through [employer].

## 2024-04-03 ENCOUNTER — CLINICAL DOCUMENTATION (OUTPATIENT)
Age: 54
End: 2024-04-03

## 2024-04-03 NOTE — TELEPHONE ENCOUNTER
Re: Aptiom    Approval rcvd. Auth# [id number], effective 04/02/24-04/02/25, scanned to chart. Update to nurse.    Re: [place]    Case# [id number], Your PA request has been closed. The request submitted does not require prior authorization. Based on this patient's pharmacy history, there is a paid claim for this medication on 4/02/24.

## 2024-05-03 ENCOUNTER — OFFICE VISIT (OUTPATIENT)
Age: 54
End: 2024-05-03
Payer: COMMERCIAL

## 2024-05-03 VITALS
BODY MASS INDEX: 38.74 KG/M2 | SYSTOLIC BLOOD PRESSURE: 133 MMHG | RESPIRATION RATE: 18 BRPM | OXYGEN SATURATION: 94 % | TEMPERATURE: 97.8 F | WEIGHT: 210.54 LBS | DIASTOLIC BLOOD PRESSURE: 79 MMHG | HEIGHT: 62 IN | HEART RATE: 65 BPM

## 2024-05-03 DIAGNOSIS — F43.10 PTSD (POST-TRAUMATIC STRESS DISORDER): ICD-10-CM

## 2024-05-03 DIAGNOSIS — F41.8 ANXIETY WITH SOMATIC FEATURES: Primary | ICD-10-CM

## 2024-05-03 PROBLEM — E66.9 OBESITY, CLASS II, BMI 35-39.9: Status: ACTIVE | Noted: 2022-06-08

## 2024-05-03 PROBLEM — E66.812 OBESITY, CLASS II, BMI 35-39.9: Status: ACTIVE | Noted: 2022-06-08

## 2024-05-03 PROCEDURE — 99213 OFFICE O/P EST LOW 20 MIN: CPT

## 2024-05-03 ASSESSMENT — ANXIETY QUESTIONNAIRES
7. FEELING AFRAID AS IF SOMETHING AWFUL MIGHT HAPPEN: NEARLY EVERY DAY
IF YOU CHECKED OFF ANY PROBLEMS ON THIS QUESTIONNAIRE, HOW DIFFICULT HAVE THESE PROBLEMS MADE IT FOR YOU TO DO YOUR WORK, TAKE CARE OF THINGS AT HOME, OR GET ALONG WITH OTHER PEOPLE: SOMEWHAT DIFFICULT
3. WORRYING TOO MUCH ABOUT DIFFERENT THINGS: MORE THAN HALF THE DAYS
4. TROUBLE RELAXING: MORE THAN HALF THE DAYS
5. BEING SO RESTLESS THAT IT IS HARD TO SIT STILL: NOT AT ALL
2. NOT BEING ABLE TO STOP OR CONTROL WORRYING: MORE THAN HALF THE DAYS
6. BECOMING EASILY ANNOYED OR IRRITABLE: MORE THAN HALF THE DAYS
GAD7 TOTAL SCORE: 13
1. FEELING NERVOUS, ANXIOUS, OR ON EDGE: MORE THAN HALF THE DAYS

## 2024-05-03 ASSESSMENT — PATIENT HEALTH QUESTIONNAIRE - PHQ9
SUM OF ALL RESPONSES TO PHQ QUESTIONS 1-9: 15
SUM OF ALL RESPONSES TO PHQ QUESTIONS 1-9: 15
9. THOUGHTS THAT YOU WOULD BE BETTER OFF DEAD, OR OF HURTING YOURSELF: NOT AT ALL
3. TROUBLE FALLING OR STAYING ASLEEP: MORE THAN HALF THE DAYS
1. LITTLE INTEREST OR PLEASURE IN DOING THINGS: NEARLY EVERY DAY
SUM OF ALL RESPONSES TO PHQ QUESTIONS 1-9: 15
6. FEELING BAD ABOUT YOURSELF - OR THAT YOU ARE A FAILURE OR HAVE LET YOURSELF OR YOUR FAMILY DOWN: MORE THAN HALF THE DAYS
5. POOR APPETITE OR OVEREATING: MORE THAN HALF THE DAYS
2. FEELING DOWN, DEPRESSED OR HOPELESS: NEARLY EVERY DAY
SUM OF ALL RESPONSES TO PHQ9 QUESTIONS 1 & 2: 6
10. IF YOU CHECKED OFF ANY PROBLEMS, HOW DIFFICULT HAVE THESE PROBLEMS MADE IT FOR YOU TO DO YOUR WORK, TAKE CARE OF THINGS AT HOME, OR GET ALONG WITH OTHER PEOPLE: SOMEWHAT DIFFICULT
SUM OF ALL RESPONSES TO PHQ QUESTIONS 1-9: 15
7. TROUBLE CONCENTRATING ON THINGS, SUCH AS READING THE NEWSPAPER OR WATCHING TELEVISION: NOT AT ALL
8. MOVING OR SPEAKING SO SLOWLY THAT OTHER PEOPLE COULD HAVE NOTICED. OR THE OPPOSITE, BEING SO FIGETY OR RESTLESS THAT YOU HAVE BEEN MOVING AROUND A LOT MORE THAN USUAL: SEVERAL DAYS
4. FEELING TIRED OR HAVING LITTLE ENERGY: MORE THAN HALF THE DAYS

## 2024-05-03 ASSESSMENT — COLUMBIA-SUICIDE SEVERITY RATING SCALE - C-SSRS
2. HAVE YOU ACTUALLY HAD ANY THOUGHTS OF KILLING YOURSELF?: NO
6. HAVE YOU EVER DONE ANYTHING, STARTED TO DO ANYTHING, OR PREPARED TO DO ANYTHING TO END YOUR LIFE?: NO
1. WITHIN THE PAST MONTH, HAVE YOU WISHED YOU WERE DEAD OR WISHED YOU COULD GO TO SLEEP AND NOT WAKE UP?: NO
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
## 2024-05-03 NOTE — PROGRESS NOTES
Identified pt with two pt identifiers(name and ). Reviewed record in preparation for visit and have obtained necessary documentation.  Chief Complaint   Patient presents with    Anxiety     F/U Buspar was increased to 10mg BID.  Pt states that she feels heavy in her chest, pain behind her left shoulder 2-3 hours before her next dose, and increased anxiety.           Health Maintenance Due   Topic    Hepatitis B vaccine (1 of 3 - 3-dose series)    COVID-19 Vaccine (1)    HIV screen     Hepatitis C screen     DTaP/Tdap/Td vaccine (1 - Tdap)    Cervical cancer screen     Diabetes screen     Lipids     Colorectal Cancer Screen     Breast cancer screen     Shingles vaccine (1 of 2)    Low dose CT lung screening &/or counseling        Vitals:    24 1437   BP: 133/79   Site: Right Upper Arm   Position: Sitting   Cuff Size: Large Adult   Pulse: 65   Resp: 18   Temp: 97.8 °F (36.6 °C)   TempSrc: Oral   SpO2: 94%   Weight: 95.5 kg (210 lb 8.6 oz)   Height: 1.575 m (5' 2\")         \"Have you been to the ER, urgent care clinic since your last visit?  Hospitalized since your last visit?\"    NO    “Have you seen or consulted any other health care providers outside of [facility] since your last visit?”    Yes, Sleep Specialist sleep apnea date unknown    Have you had a mammogram?”   NO    No breast cancer screening on file      “Have you had a pap smear?”    NO    No cervical cancer screening on file         “Have you had a colorectal cancer screening such as a colonoscopy/FIT/Cologuard?    Yes, 4 years ago in [place] Dr. Adrian Hairston    No colonoscopy on file  No cologuard on file  No FIT/FOBT on file   No flexible sigmoidoscopy on file         Click Here for Release of Records Request     This patient is accompanied in the office by her self.  I have received verbal consent from Neda Canela to discuss any/all medical information while they are present in the room.

## 2024-06-04 ENCOUNTER — CLINICAL DOCUMENTATION (OUTPATIENT)
Age: 54
End: 2024-06-04

## 2024-06-04 ENCOUNTER — OFFICE VISIT (OUTPATIENT)
Age: 54
End: 2024-06-04
Payer: COMMERCIAL

## 2024-06-04 VITALS
BODY MASS INDEX: 38.59 KG/M2 | SYSTOLIC BLOOD PRESSURE: 175 MMHG | OXYGEN SATURATION: 96 % | RESPIRATION RATE: 18 BRPM | DIASTOLIC BLOOD PRESSURE: 94 MMHG | WEIGHT: 211 LBS | HEART RATE: 64 BPM

## 2024-06-04 DIAGNOSIS — Z13.220 SCREENING, LIPID: ICD-10-CM

## 2024-06-04 DIAGNOSIS — E66.9 OBESITY, CLASS II, BMI 35-39.9: ICD-10-CM

## 2024-06-04 DIAGNOSIS — Z13.1 SCREENING FOR DIABETES MELLITUS: Primary | ICD-10-CM

## 2024-06-04 DIAGNOSIS — Z87.891 PERSONAL HISTORY OF TOBACCO USE: ICD-10-CM

## 2024-06-04 DIAGNOSIS — L98.9 SKIN LESION: ICD-10-CM

## 2024-06-04 DIAGNOSIS — R07.89 OTHER CHEST PAIN: ICD-10-CM

## 2024-06-04 PROCEDURE — 99396 PREV VISIT EST AGE 40-64: CPT

## 2024-06-04 PROCEDURE — 93010 ELECTROCARDIOGRAM REPORT: CPT

## 2024-06-04 PROCEDURE — 93005 ELECTROCARDIOGRAM TRACING: CPT

## 2024-06-04 PROCEDURE — G0296 VISIT TO DETERM LDCT ELIG: HCPCS

## 2024-06-04 NOTE — PATIENT INSTRUCTIONS
Please call Central Scheduling [phone] to set up the appointment         Learning About Lung Cancer Screening  What is screening for lung cancer?     Lung cancer screening is a way to find some lung cancers early, before a person has any symptoms of the cancer.  Lung cancer screening may help those who have the highest risk for lung cancer--people age 50 and older who are or were heavy smokers. For most people, who aren't at increased risk, screening for lung cancer probably isn't helpful.  Screening won't prevent cancer. And it may not find all lung cancers. Lung cancer screening may lower the risk of dying from lung cancer in a small number of people.  How is it done?  Lung cancer screening is done with a low-dose CT (computed tomography) scan. A CT scan uses X-rays, or radiation, to make detailed pictures of your body. Experts recommend that screening be done in medical centers that focus on finding and treating lung cancer.  Who is screening recommended for?  Lung cancer screening is recommended for people age 50 and older who are or were heavy smokers. That means people with a smoking history of at least 20 pack years. A pack year is a way to measure how heavy a smoker you are or were.  To figure out your pack years, multiply how many packs a day on average (assuming 20 cigarettes per pack) you have smoked by how many years you have smoked. For example:  If you smoked 1 pack a day for 20 years, that's 1 times 20. So you have a smoking history of 20 pack years.  If you smoked 2 packs a day for 10 years, that's 2 times 10. So you have a smoking history of 20 pack years.  Experts agree that screening is for people who have a high risk of lung cancer. But experts don't agree on what high risk means. Some say people age 50 or older with at least a 20-pack-year smoking history are high risk. Others say it's people age 55 or older with a 30-pack-year history.  To see if you could benefit from screening, first

## 2024-06-04 NOTE — PROGRESS NOTES
Left message again to return call. Bone mineral density with osteopenia, but not osteoporosis. No surgical indications for parathyroidectomy. Can continue to monitor levels at next visit with [facility] as scheduled 9/2022. Can also cancel upcoming visit with Sam Rodriguez as she will not be starting on Ozempic.     Reynaldo Medina MD Received compliance data report from [employer] and scanned into media.

## 2024-06-06 LAB
ANION GAP SERPL CALC-SCNC: 6 MMOL/L (ref 5–15)
BUN SERPL-MCNC: 8 MG/DL (ref 6–20)
BUN/CREAT SERPL: 9 (ref 12–20)
CALCIUM SERPL-MCNC: 10.5 MG/DL (ref 8.5–10.1)
CHLORIDE SERPL-SCNC: 105 MMOL/L (ref 97–108)
CHOLEST SERPL-MCNC: 254 MG/DL
CO2 SERPL-SCNC: 28 MMOL/L (ref 21–32)
CREAT SERPL-MCNC: 0.87 MG/DL (ref 0.55–1.02)
EST. AVERAGE GLUCOSE BLD GHB EST-MCNC: 111 MG/DL
GLUCOSE SERPL-MCNC: 96 MG/DL (ref 65–100)
HBA1C MFR BLD: 5.5 % (ref 4–5.6)
HDLC SERPL-MCNC: 63 MG/DL
HDLC SERPL: 4 (ref 0–5)
LDLC SERPL CALC-MCNC: 168.8 MG/DL (ref 0–100)
POTASSIUM SERPL-SCNC: 4.2 MMOL/L (ref 3.5–5.1)
SODIUM SERPL-SCNC: 139 MMOL/L (ref 136–145)
TRIGL SERPL-MCNC: 111 MG/DL
VLDLC SERPL CALC-MCNC: 22.2 MG/DL

## 2024-06-10 DIAGNOSIS — F41.8 ANXIETY WITH SOMATIC FEATURES: ICD-10-CM

## 2024-06-12 RX ORDER — BUSPIRONE HYDROCHLORIDE 10 MG/1
10 TABLET ORAL 2 TIMES DAILY
Qty: 60 TABLET | Refills: 2 | Status: SHIPPED | OUTPATIENT
Start: 2024-06-12

## 2024-06-15 ASSESSMENT — SLEEP AND FATIGUE QUESTIONNAIRES
HOW LIKELY ARE YOU TO NOD OFF OR FALL ASLEEP WHILE LYING DOWN TO REST IN THE AFTERNOON WHEN CIRCUMSTANCES PERMIT: WOULD NEVER DOZE
HOW LIKELY ARE YOU TO NOD OFF OR FALL ASLEEP WHILE SITTING QUIETLY AFTER LUNCH WITHOUT ALCOHOL: WOULD NEVER DOZE
HOW LIKELY ARE YOU TO NOD OFF OR FALL ASLEEP WHILE SITTING AND READING: WOULD NEVER DOZE
ESS TOTAL SCORE: 0
HOW LIKELY ARE YOU TO NOD OFF OR FALL ASLEEP WHILE SITTING QUIETLY AFTER LUNCH WITHOUT ALCOHOL: WOULD NEVER DOZE
DO YOU HAVE DIFFICULTY OPERATING A MOTOR VEHICLE FOR SHORT DISTANCES (LESS THAN 100 MILES) BECAUSE YOU BECOME SLEEPY: NO
HOW LIKELY ARE YOU TO NOD OFF OR FALL ASLEEP WHILE SITTING AND TALKING TO SOMEONE: WOULD NEVER DOZE
HAS YOUR RELATIONSHIP WITH FAMILY, FRIENDS OR WORK COLLEAGUES BEEN AFFECTED BECAUSE YOU ARE SLEEPY OR TIRED: NO
DO YOU HAVE DIFFICULTY WATCHING A MOVIE OR VIDEO BECAUSE YOU BECOME SLEEPY OR TIRED: NO
HOW LIKELY ARE YOU TO NOD OFF OR FALL ASLEEP WHILE WATCHING TV: WOULD NEVER DOZE
DO YOU HAVE DIFFICULTY OPERATING A MOTOR VEHICLE FOR LONG DISTANCES (GREATER THAN 100 MILES) BECAUSE YOU BECOME SLEEPY: NO
DO YOU HAVE DIFFICULTY BEING AS ACTIVE AS YOU WANT TO BE IN THE MORNING BECAUSE YOU ARE SLEEPY OR TIRED: NO
HOW LIKELY ARE YOU TO NOD OFF OR FALL ASLEEP WHILE SITTING INACTIVE IN A PUBLIC PLACE: WOULD NEVER DOZE
HOW LIKELY ARE YOU TO NOD OFF OR FALL ASLEEP WHEN YOU ARE A PASSENGER IN A CAR FOR AN HOUR WITHOUT A BREAK: WOULD NEVER DOZE
HOW LIKELY ARE YOU TO NOD OFF OR FALL ASLEEP WHILE SITTING AND READING: WOULD NEVER DOZE
DO YOU HAVE DIFFICULTY BEING AS ACTIVE AS YOU WANT TO BE IN THE EVENING BECAUSE YOU ARE SLEEPY OR TIRED: NO
HOW LIKELY ARE YOU TO NOD OFF OR FALL ASLEEP IN A CAR, WHILE STOPPED FOR A FEW MINUTES IN TRAFFIC: WOULD NEVER DOZE
HOW LIKELY ARE YOU TO NOD OFF OR FALL ASLEEP WHEN YOU ARE A PASSENGER IN A CAR FOR AN HOUR WITHOUT A BREAK: WOULD NEVER DOZE
FOSQ SCORE: 20
DO YOU HAVE DIFFICULTY VISITING YOUR FAMILY OR FRIENDS IN THEIR HOME BECAUSE YOU BECOME SLEEPY OR TIRED: NO
HOW LIKELY ARE YOU TO NOD OFF OR FALL ASLEEP WHILE WATCHING TV: WOULD NEVER DOZE
HOW LIKELY ARE YOU TO NOD OFF OR FALL ASLEEP WHILE SITTING INACTIVE IN A PUBLIC PLACE: WOULD NEVER DOZE
HOW LIKELY ARE YOU TO NOD OFF OR FALL ASLEEP IN A CAR, WHILE STOPPED FOR A FEW MINUTES IN TRAFFIC: WOULD NEVER DOZE
HOW LIKELY ARE YOU TO NOD OFF OR FALL ASLEEP WHILE SITTING AND TALKING TO SOMEONE: WOULD NEVER DOZE
HOW LIKELY ARE YOU TO NOD OFF OR FALL ASLEEP WHILE LYING DOWN TO REST IN THE AFTERNOON WHEN CIRCUMSTANCES PERMIT: WOULD NEVER DOZE
DO YOU HAVE DIFFICULTY CONCENTRATING ON THE THINGS YOU DO BECAUSE YOU ARE SLEEPY OR TIRED: NO
DO YOU GENERALLY HAVE DIFFICULTY REMEMBERING THINGS BECAUSE YOU ARE SLEEPY OR TIRED: NO
HAS YOUR MOOD BEEN AFFECTED BECAUSE YOU ARE SLEEPY OR TIRED: NO

## 2024-06-18 ENCOUNTER — OFFICE VISIT (OUTPATIENT)
Age: 54
End: 2024-06-18
Payer: COMMERCIAL

## 2024-06-18 VITALS
HEIGHT: 62 IN | HEART RATE: 61 BPM | OXYGEN SATURATION: 98 % | WEIGHT: 208 LBS | SYSTOLIC BLOOD PRESSURE: 138 MMHG | BODY MASS INDEX: 38.28 KG/M2 | DIASTOLIC BLOOD PRESSURE: 75 MMHG

## 2024-06-18 DIAGNOSIS — Z78.9 INTOLERANCE OF CONTINUOUS POSITIVE AIRWAY PRESSURE (CPAP) VENTILATION: ICD-10-CM

## 2024-06-18 DIAGNOSIS — G47.33 OSA (OBSTRUCTIVE SLEEP APNEA): Primary | ICD-10-CM

## 2024-06-18 PROCEDURE — 99213 OFFICE O/P EST LOW 20 MIN: CPT | Performed by: SPECIALIST

## 2024-06-18 NOTE — PROGRESS NOTES
[facility]  [facility]  [address] [address]  Dept: [phone]              [address]  [place] [address]  Tel.  [phone]  Fax. [phone] [address]  [place] [address]  Tel.  [phone]  Fax. [phone] [address] [address]  Tel.  [phone]  Fax. [phone]         Chief Complaint       Chief Complaint   Patient presents with    Sleep Apnea     1st adh         HPI        Neda Canela is a 53 y.o. female seen for follow-up. She was evaluated with a WatchPAT HSAT sleep study which demonstrated sleep disordered breathing.AHI 4.4/h (4% desaturation definition). AHI 14.5/h (3% desaturation definition) with REM related AHI 18/h. Prominent snoring noted. Minimal SpO2 88%.     APAP 6 to 16 cm initiated.  Device set up date:24     Compliance details reviewed with the patient today.  During the past 61 days, APAP 6-16 cm used during 30 days.  CMS compliance 41%.  Average use 4.85 hours.  Average AHI 1/h.  Mask leak within acceptable limits.    With APAP patient was not experiencing excessive daytime sleepiness.  Linn Sleepiness Scale: 0    Patient was experiencing difficulty tolerating PAP.  She returned the device today.      No Known Allergies    No current facility-administered medications for this visit.     She  has a past medical history of Anxiety, Gestational diabetes, Heavy menstrual bleeding, PTSD (post-traumatic stress disorder), and Seizure ([facility]).    She  has a past surgical history that includes  section ( & ); Tubal ligation (); Tonsillectomy; and Cholecystectomy ().    She family history includes COPD in her father; Cancer in her mother; Diabetes in her sister; Heart Attack in her father; Heart Disease in her father; Hypertension in her sister.    She  reports that she quit smoking about 3 years ago. Her smoking

## 2024-06-19 ENCOUNTER — HOSPITAL ENCOUNTER (OUTPATIENT)
Facility: HOSPITAL | Age: 54
Discharge: HOME | End: 2024-06-22
Payer: COMMERCIAL

## 2024-06-19 DIAGNOSIS — Z87.891 PERSONAL HISTORY OF TOBACCO USE: ICD-10-CM

## 2024-06-19 PROCEDURE — 71271 CT THORAX LUNG CANCER SCR C-: CPT

## 2024-07-25 ENCOUNTER — TELEPHONE (OUTPATIENT)
Age: 54
End: 2024-07-25

## 2024-07-25 NOTE — TELEPHONE ENCOUNTER
----- Message from David Peter sent at 7/24/2024  3:28 PM EDT -----  Regarding: [facility] Appointment Request  E[facility] Appointment Request    Patient needs appointment for E[facility] Appointment Type: New to Provider.    Patient Requested Dates(s): AROUND AUGUST 1   Patient Requested Time: ANY TIME   Provider Name:  FEMALE DOCTOR     Reason for Appointment Request: New Patient - No appointments available during search  --------------------------------------------------------------------------------------------------------------------------    Relationship to Patient: Self     Call Back Information: OK to leave message on voicemail  Preferred Call Back Number: Phone [phone]

## 2024-07-29 ENCOUNTER — OFFICE VISIT (OUTPATIENT)
Age: 54
End: 2024-07-29
Payer: COMMERCIAL

## 2024-07-29 VITALS
HEART RATE: 74 BPM | SYSTOLIC BLOOD PRESSURE: 146 MMHG | DIASTOLIC BLOOD PRESSURE: 70 MMHG | HEIGHT: 62 IN | BODY MASS INDEX: 38.28 KG/M2 | WEIGHT: 208 LBS | OXYGEN SATURATION: 96 % | RESPIRATION RATE: 16 BRPM

## 2024-07-29 DIAGNOSIS — G40.109 TEMPORAL LOBE EPILEPSY (HCC): Primary | ICD-10-CM

## 2024-07-29 PROCEDURE — 99214 OFFICE O/P EST MOD 30 MIN: CPT | Performed by: PSYCHIATRY & NEUROLOGY

## 2024-07-29 RX ORDER — ESLICARBAZEPINE ACETATE 600 MG/1
600 TABLET ORAL DAILY
Qty: 30 TABLET | Refills: 5 | Status: SHIPPED | OUTPATIENT
Start: 2024-07-29

## 2024-07-29 NOTE — PROGRESS NOTES
[facility] Neurology Clinics and Neurodiagnostic Center at [facility] Neurology Clinics at [address] [place] [address] [address] [place] [address] [phone]              Chief Complaint   Patient presents with    Seizures     left temporal epilepsy// Start Aptiom 600 mg daily // no seizures since last visit and reports no SE w/ the Aptiom      Current Outpatient Medications   Medication Sig Dispense Refill    busPIRone (BUSPAR) 10 MG tablet TAKE 1 TABLET BY MOUTH 2 TIMES A DAY 60 tablet 2    eslicarbazepine (APTIOM) 600 MG TABS tablet Take 1 tablet by mouth daily 30 tablet 5    diazePAM, 15 MG Dose, (VALTOCO 15 MG DOSE) 2 x 7.5 MG/0.1ML LQPK Spray 7.5 mg in to each nostril for seizure flurries.  Max dose 15 mg in 24 hrs 6 each 3    levonorgestrel (MIRENA, 52 MG,) IUD 52 mg by IntraUTERine route       No current facility-administered medications for this visit.      No Known Allergies  Social History     Tobacco Use    Smoking status: Former     Current packs/day: 0.00     Average packs/day: 2.0 packs/day for 35.0 years (70.0 ttl pk-yrs)     Types: Cigarettes     Start date: 1/1/1986     Quit date: 1/1/2021     Years since quitting: 3.5     Passive exposure: Never    Smokeless tobacco: Never    Tobacco comments:     33 yrs tobacco past 3 yrs vaping.   Vaping Use    Vaping Use: Every day    Start date: 1/1/2021    Substances: Nicotine, Flavoring    Devices: Disposable, Pre-filled or refillable cartridge, Pre-filled pod, No    Passive vaping exposure: Yes   Substance Use Topics    Alcohol use: Not Currently     Comment: 2-3 weekly    Drug use: Never     53-year-old lady following up for epilepsy.  We started Aptiom low-dose titrating to 600 mg daily.  She has tolerated the Aptiom without difficulty.  No further seizure.    Last Seizure  March 2024    Previous antiseizure medications  Zonegran--side
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
## 2024-09-05 ENCOUNTER — OFFICE VISIT (OUTPATIENT)
Age: 54
End: 2024-09-05
Payer: COMMERCIAL

## 2024-09-05 VITALS
HEART RATE: 64 BPM | TEMPERATURE: 98 F | RESPIRATION RATE: 18 BRPM | DIASTOLIC BLOOD PRESSURE: 78 MMHG | SYSTOLIC BLOOD PRESSURE: 138 MMHG | BODY MASS INDEX: 37.54 KG/M2 | OXYGEN SATURATION: 95 % | HEIGHT: 62 IN | WEIGHT: 204 LBS

## 2024-09-05 DIAGNOSIS — F41.8 ANXIETY WITH SOMATIC FEATURES: ICD-10-CM

## 2024-09-05 PROCEDURE — 99214 OFFICE O/P EST MOD 30 MIN: CPT

## 2024-09-05 RX ORDER — BUSPIRONE HYDROCHLORIDE 10 MG/1
10 TABLET ORAL 2 TIMES DAILY
Qty: 60 TABLET | Refills: 2 | Status: SHIPPED | OUTPATIENT
Start: 2024-09-05

## 2024-09-05 ASSESSMENT — PATIENT HEALTH QUESTIONNAIRE - PHQ9
2. FEELING DOWN, DEPRESSED OR HOPELESS: SEVERAL DAYS
SUM OF ALL RESPONSES TO PHQ9 QUESTIONS 1 & 2: 1
3. TROUBLE FALLING OR STAYING ASLEEP: NOT AT ALL
6. FEELING BAD ABOUT YOURSELF - OR THAT YOU ARE A FAILURE OR HAVE LET YOURSELF OR YOUR FAMILY DOWN: NOT AT ALL
10. IF YOU CHECKED OFF ANY PROBLEMS, HOW DIFFICULT HAVE THESE PROBLEMS MADE IT FOR YOU TO DO YOUR WORK, TAKE CARE OF THINGS AT HOME, OR GET ALONG WITH OTHER PEOPLE: SOMEWHAT DIFFICULT
1. LITTLE INTEREST OR PLEASURE IN DOING THINGS: NOT AT ALL
7. TROUBLE CONCENTRATING ON THINGS, SUCH AS READING THE NEWSPAPER OR WATCHING TELEVISION: SEVERAL DAYS
8. MOVING OR SPEAKING SO SLOWLY THAT OTHER PEOPLE COULD HAVE NOTICED. OR THE OPPOSITE, BEING SO FIGETY OR RESTLESS THAT YOU HAVE BEEN MOVING AROUND A LOT MORE THAN USUAL: NOT AT ALL
SUM OF ALL RESPONSES TO PHQ QUESTIONS 1-9: 2
9. THOUGHTS THAT YOU WOULD BE BETTER OFF DEAD, OR OF HURTING YOURSELF: NOT AT ALL
4. FEELING TIRED OR HAVING LITTLE ENERGY: NOT AT ALL
SUM OF ALL RESPONSES TO PHQ QUESTIONS 1-9: 2
5. POOR APPETITE OR OVEREATING: NOT AT ALL

## 2024-09-05 ASSESSMENT — ANXIETY QUESTIONNAIRES
IF YOU CHECKED OFF ANY PROBLEMS ON THIS QUESTIONNAIRE, HOW DIFFICULT HAVE THESE PROBLEMS MADE IT FOR YOU TO DO YOUR WORK, TAKE CARE OF THINGS AT HOME, OR GET ALONG WITH OTHER PEOPLE: SOMEWHAT DIFFICULT
4. TROUBLE RELAXING: NOT AT ALL
5. BEING SO RESTLESS THAT IT IS HARD TO SIT STILL: NOT AT ALL
6. BECOMING EASILY ANNOYED OR IRRITABLE: SEVERAL DAYS
3. WORRYING TOO MUCH ABOUT DIFFERENT THINGS: SEVERAL DAYS
7. FEELING AFRAID AS IF SOMETHING AWFUL MIGHT HAPPEN: SEVERAL DAYS
2. NOT BEING ABLE TO STOP OR CONTROL WORRYING: SEVERAL DAYS
GAD7 TOTAL SCORE: 5
1. FEELING NERVOUS, ANXIOUS, OR ON EDGE: SEVERAL DAYS

## 2024-09-05 NOTE — PROGRESS NOTES
Room 23    Patient is accompanied by self. I have received verbal consent from Neda Canela to discuss any/all medical information while they are present in the room.    Identified pt with two pt identifiers(name and ). Reviewed record in preparation for visit and have obtained necessary documentation.  Chief Complaint   Patient presents with    Anxiety      Establish care / refills needed on Buspar     Health Maintenance Due   Topic    HIV screen     Hepatitis C screen     Hepatitis B vaccine (1 of 3 - 19+ 3-dose series)    DTaP/Tdap/Td vaccine (1 - Tdap)    Breast cancer screen     Colorectal Cancer Screen     Shingles vaccine (1 of 2)    Flu vaccine (1)    COVID-19 Vaccine ( season)       Vitals:    24 0850   BP: 138/78   Site: Right Upper Arm   Position: Sitting   Cuff Size: Large Adult   Pulse: 64   Resp: 18   Temp: 98 °F (36.7 °C)   TempSrc: Temporal   SpO2: 95%   Weight: 92.5 kg (204 lb)   Height: 1.575 m (5' 2\")       Social Determinants Of Health:       SDOH screening not required at visit.  Resources Declined.   See AVS for attached resources, if requested.    Coordination of Care Questionnaire:       \"Have you been to the ER, urgent care clinic since your last visit?  Hospitalized since your last visit?\"    NO    “Have you seen or consulted any other health care providers outside of [facility] since your last visit?”    NO    Have you had a mammogram?”   NO    No breast cancer screening on file         “Have you had a colorectal cancer screening such as a colonoscopy/FIT/Cologuard?    NO    No colonoscopy on file  No cologuard on file  No FIT/FOBT on file   No flexible sigmoidoscopy on file         Click Here for Release of Records Request

## 2024-10-03 ENCOUNTER — OFFICE VISIT (OUTPATIENT)
Age: 54
End: 2024-10-03

## 2024-10-03 DIAGNOSIS — F41.9 ANXIETY: Primary | ICD-10-CM

## 2024-10-03 NOTE — PROGRESS NOTES
[facility] Residency     Pt left the clinic tearfully after rooming. I called the patient but partner picked up and let me know they will reschedule soon.     Future Appointments   Date Time Provider Department Center   1/27/2025  8:45 AM Aline Boykin MD [id number]

## 2024-10-09 ENCOUNTER — TELEPHONE (OUTPATIENT)
Age: 54
End: 2024-10-09

## 2024-10-09 NOTE — TELEPHONE ENCOUNTER
Pt stated that you contacted her and informed her to schedule an appt with you next week (due to walking out of her last visit); however, you are only scheduled for 10/16 and you are booked. Pt is refusing to see another dr and said she does not do well with seeing multiple drs. Please advise how to proceed or contact pt to discuss this matter.    Thank you

## 2024-10-16 ENCOUNTER — OFFICE VISIT (OUTPATIENT)
Age: 54
End: 2024-10-16
Payer: COMMERCIAL

## 2024-10-16 VITALS
BODY MASS INDEX: 36.99 KG/M2 | HEART RATE: 68 BPM | HEIGHT: 62 IN | TEMPERATURE: 98 F | RESPIRATION RATE: 18 BRPM | SYSTOLIC BLOOD PRESSURE: 139 MMHG | DIASTOLIC BLOOD PRESSURE: 76 MMHG | OXYGEN SATURATION: 96 % | WEIGHT: 201 LBS

## 2024-10-16 DIAGNOSIS — F41.9 ANXIETY: Primary | ICD-10-CM

## 2024-10-16 DIAGNOSIS — N95.1 VASOMOTOR SYMPTOMS DUE TO MENOPAUSE: ICD-10-CM

## 2024-10-16 PROCEDURE — 99214 OFFICE O/P EST MOD 30 MIN: CPT

## 2024-10-16 RX ORDER — BUSPIRONE HYDROCHLORIDE 30 MG/1
30 TABLET ORAL 2 TIMES DAILY
Qty: 60 TABLET | Refills: 0 | Status: SHIPPED | OUTPATIENT
Start: 2024-10-16 | End: 2024-10-18 | Stop reason: SDUPTHER

## 2024-10-16 RX ORDER — ALPRAZOLAM 0.5 MG
0.5 TABLET ORAL PRN
Qty: 30 TABLET | Refills: 0 | Status: SHIPPED | OUTPATIENT
Start: 2024-10-16 | End: 2024-10-18 | Stop reason: SDUPTHER

## 2024-10-16 NOTE — PROGRESS NOTES
Room 19    Patient is accompanied by self. I have received verbal consent from Neda Canela to discuss any/all medical information while they are present in the room.    Identified pt with two pt identifiers(name and ). Reviewed record in preparation for visit and have obtained necessary documentation.  Chief Complaint   Patient presents with    Anxiety      Discuss med dose increase of Buspar     Health Maintenance Due   Topic    HIV screen     Hepatitis C screen     Hepatitis B vaccine (1 of 3 - 19+ 3-dose series)    DTaP/Tdap/Td vaccine (1 - Tdap)    Breast cancer screen     Colorectal Cancer Screen     Shingles vaccine (1 of 2)    Flu vaccine (1)    COVID-19 Vaccine ( season)       Vitals:    10/16/24 1037   BP: 139/76   Site: Left Upper Arm   Position: Sitting   Cuff Size: Large Adult   Pulse: 68   Resp: 18   Temp: 98 °F (36.7 °C)   TempSrc: Temporal   SpO2: 96%   Weight: 91.2 kg (201 lb)   Height: 1.575 m (5' 2\")       Social Determinants Of Health:       SDOH screening not required at visit.  Resources Declined.   See AVS for attached resources, if requested.    Coordination of Care Questionnaire:       \"Have you been to the ER, urgent care clinic since your last visit?  Hospitalized since your last visit?\"    NO    “Have you seen or consulted any other health care providers outside of [facility] since your last visit?”    NO    Have you had a mammogram?”   NO    No breast cancer screening on file         “Have you had a colorectal cancer screening such as a colonoscopy/FIT/Cologuard?    NO    No colonoscopy on file  No cologuard on file  No FIT/FOBT on file   No flexible sigmoidoscopy on file         Click Here for Release of Records Request   
diabetes 1998    Heavy menstrual bleeding 1998    Currently on Mirena, which helps a lot, due to come out on 2025, establishing with OBGAR[facility]AN next month    PTSD (post-traumatic stress disorder)     Related to unfortunate relationships. Currently getting counseling (Alfred Padilla) which she finds more helpful than medication    Seizure (HCC) 2022    Follows with Dr. Boykin, was previously on trileptal but not currently on any medications       Medications  Current Outpatient Medications   Medication Sig    busPIRone (BUSPAR) 10 MG tablet Take 1 tablet by mouth 2 times daily    eslicarbazepine (APTIOM) 600 MG TABS tablet Take 1 tablet by mouth daily    diazePAM, 15 MG Dose, (VALTOCO 15 MG DOSE) 2 x 7.5 MG/0.1ML LQPK Spray 7.5 mg in to each nostril for seizure flurries.  Max dose 15 mg in 24 hrs    levonorgestrel (MIRENA, 52 MG,) IUD 52 mg by IntraUTERine route    ALPRAZolam (XANAX) 0.5 MG tablet Take 1 tablet by mouth as needed for Sleep or Anxiety for up to 30 days.    busPIRone (BUSPAR) 30 MG tablet Take 30 mg by mouth in the morning and at bedtime     No current facility-administered medications for this visit.       Immunizations     There is no immunization history on file for this patient.    Allergies   No Known Allergies    Objective   Vital Signs  /76 (Site: Left Upper Arm, Position: Sitting, Cuff Size: Large Adult)   Pulse 68   Temp 98 °F (36.7 °C) (Temporal)   Resp 18   Ht 1.575 m (5' 2\")   Wt 91.2 kg (201 lb)   SpO2 96%   BMI 36.76 kg/m²     Physical Examination  Physical Exam     Assessment and Plan   Neda Canela is a 53 y.o. female who presents for Anxiety      1. Anxiety: Pt continues to have uncontrolled anxiety. KENNETH 10; PHQ 2 today. Has tried SSRI/SNRI but thsi made her feel depressed and unable to leave her home. She also has history of seizures. No SI/HI.   - Increase Buspar from 15mg in qam and 10mg qhs to 30mg BID   - busPIRone (BUSPAR) 30 MG tablet BID  - Pt was last in clinic 2 weeks

## 2024-10-18 ENCOUNTER — PATIENT MESSAGE (OUTPATIENT)
Age: 54
End: 2024-10-18

## 2024-10-18 ENCOUNTER — TELEPHONE (OUTPATIENT)
Age: 54
End: 2024-10-18

## 2024-10-18 DIAGNOSIS — F41.9 ANXIETY: ICD-10-CM

## 2024-10-18 RX ORDER — ALPRAZOLAM 0.5 MG
0.5 TABLET ORAL PRN
Qty: 30 TABLET | Refills: 0 | OUTPATIENT
Start: 2024-10-18 | End: 2024-11-17

## 2024-10-18 RX ORDER — BUSPIRONE HYDROCHLORIDE 30 MG/1
30 TABLET ORAL 2 TIMES DAILY
Qty: 60 TABLET | Refills: 0 | Status: SHIPPED | OUTPATIENT
Start: 2024-10-18 | End: 2024-11-17

## 2024-10-18 NOTE — TELEPHONE ENCOUNTER
Hi Dr. Carlson,    I spoke with you about this patient on 10/16 informing you that the patient was unable to  her medication due to you not being enrolled in the medicaid system. They will need an attending to send over this medication. Were you able to speak to Dr. Soto about this?

## 2024-10-23 SDOH — ECONOMIC STABILITY: FOOD INSECURITY: WITHIN THE PAST 12 MONTHS, THE FOOD YOU BOUGHT JUST DIDN'T LAST AND YOU DIDN'T HAVE MONEY TO GET MORE.: PATIENT DECLINED

## 2024-10-23 SDOH — ECONOMIC STABILITY: TRANSPORTATION INSECURITY
IN THE PAST 12 MONTHS, HAS LACK OF TRANSPORTATION KEPT YOU FROM MEETINGS, WORK, OR FROM GETTING THINGS NEEDED FOR DAILY LIVING?: PATIENT DECLINED

## 2024-10-23 SDOH — ECONOMIC STABILITY: FOOD INSECURITY: WITHIN THE PAST 12 MONTHS, YOU WORRIED THAT YOUR FOOD WOULD RUN OUT BEFORE YOU GOT MONEY TO BUY MORE.: PATIENT DECLINED

## 2024-10-23 SDOH — ECONOMIC STABILITY: INCOME INSECURITY: HOW HARD IS IT FOR YOU TO PAY FOR THE VERY BASICS LIKE FOOD, HOUSING, MEDICAL CARE, AND HEATING?: PATIENT DECLINED

## 2024-10-24 ENCOUNTER — TELEMEDICINE (OUTPATIENT)
Age: 54
End: 2024-10-24
Payer: COMMERCIAL

## 2024-10-24 DIAGNOSIS — F41.9 ANXIETY: Primary | ICD-10-CM

## 2024-10-24 DIAGNOSIS — N95.1 VASOMOTOR SYMPTOMS DUE TO MENOPAUSE: ICD-10-CM

## 2024-10-24 PROCEDURE — 99213 OFFICE O/P EST LOW 20 MIN: CPT

## 2024-10-24 RX ORDER — GABAPENTIN 100 MG/1
100 CAPSULE ORAL DAILY
Qty: 30 CAPSULE | Refills: 0 | Status: SHIPPED | OUTPATIENT
Start: 2024-10-24 | End: 2024-11-23

## 2024-10-24 NOTE — PROGRESS NOTES
History   Problem Relation Age of Onset    Cancer Mother     COPD Father     Heart Disease Father     Heart Attack Father     Diabetes Sister     Hypertension Sister      Social History     Tobacco Use    Smoking status: Former     Current packs/day: 0.00     Average packs/day: 2.0 packs/day for 35.0 years (70.0 ttl pk-yrs)     Types: Cigarettes     Start date: 1/1/1986     Quit date: 1/1/2021     Years since quitting: 3.8     Passive exposure: Never    Smokeless tobacco: Never    Tobacco comments:     33 yrs tobacco past 3 yrs vaping.   Vaping Use    Vaping status: Every Day    Start date: 1/1/2021    Substances: Nicotine, Flavoring    Devices: Disposable, Pre-filled or refillable cartridge, Pre-filled pod, No    Passive vaping exposure: Yes   Substance Use Topics    Alcohol use: Not Currently     Comment: 2-3 weekly    Drug use: Never              Current Medications/Allergies   Medications and Allergies reviewed:    Current Outpatient Medications   Medication Sig Dispense Refill    gabapentin (NEURONTIN) 100 MG capsule Take 1 capsule by mouth daily for 30 days. Intended supply: 90 days Max Daily Amount: 100 mg 30 capsule 0    ALPRAZolam (XANAX) 0.5 MG tablet Take 1 tablet by mouth as needed for Sleep or Anxiety for up to 30 days. 30 tablet 0    busPIRone (BUSPAR) 30 MG tablet Take 30 mg by mouth in the morning and at bedtime 60 tablet 0    busPIRone (BUSPAR) 10 MG tablet Take 1 tablet by mouth 2 times daily 60 tablet 2    eslicarbazepine (APTIOM) 600 MG TABS tablet Take 1 tablet by mouth daily 30 tablet 5    diazePAM, 15 MG Dose, (VALTOCO 15 MG DOSE) 2 x 7.5 MG/0.1ML LQPK Spray 7.5 mg in to each nostril for seizure flurries.  Max dose 15 mg in 24 hrs 6 each 3    levonorgestrel (MIRENA, 52 MG,) IUD 52 mg by IntraUTERine route       No current facility-administered medications for this visit.     No Known Allergies

## 2024-10-30 ENCOUNTER — TELEPHONE (OUTPATIENT)
Age: 54
End: 2024-10-30

## 2024-10-31 ENCOUNTER — TELEPHONE (OUTPATIENT)
Age: 54
End: 2024-10-31

## 2024-11-20 ENCOUNTER — OFFICE VISIT (OUTPATIENT)
Age: 54
End: 2024-11-20
Payer: COMMERCIAL

## 2024-11-20 VITALS
HEIGHT: 62 IN | OXYGEN SATURATION: 95 % | SYSTOLIC BLOOD PRESSURE: 130 MMHG | BODY MASS INDEX: 38.46 KG/M2 | RESPIRATION RATE: 18 BRPM | WEIGHT: 209 LBS | DIASTOLIC BLOOD PRESSURE: 75 MMHG | TEMPERATURE: 98 F | HEART RATE: 61 BPM

## 2024-11-20 DIAGNOSIS — F41.9 ANXIETY: Primary | ICD-10-CM

## 2024-11-20 DIAGNOSIS — N95.1 VASOMOTOR SYMPTOMS DUE TO MENOPAUSE: ICD-10-CM

## 2024-11-20 PROCEDURE — 99213 OFFICE O/P EST LOW 20 MIN: CPT

## 2024-11-20 RX ORDER — GABAPENTIN 100 MG/1
100 CAPSULE ORAL DAILY
Qty: 30 CAPSULE | Refills: 0 | Status: SHIPPED | OUTPATIENT
Start: 2024-11-20 | End: 2024-12-20

## 2024-11-20 RX ORDER — BUSPIRONE HYDROCHLORIDE 30 MG/1
30 TABLET ORAL 2 TIMES DAILY
Qty: 60 TABLET | Refills: 0 | Status: SHIPPED | OUTPATIENT
Start: 2024-11-20

## 2024-11-20 RX ORDER — BUSPIRONE HYDROCHLORIDE 30 MG/1
30 TABLET ORAL 2 TIMES DAILY
COMMUNITY
End: 2024-11-20 | Stop reason: SDUPTHER

## 2024-11-20 RX ORDER — GABAPENTIN 100 MG/1
100 CAPSULE ORAL DAILY
Qty: 30 CAPSULE | Refills: 2 | Status: CANCELLED | OUTPATIENT
Start: 2024-11-20 | End: 2025-02-18

## 2024-11-20 NOTE — PROGRESS NOTES
Room 20    Patient is accompanied by self. I have received verbal consent from Neda Canela to discuss any/all medical information while they are present in the room.    Identified pt with two pt identifiers(name and ). Reviewed record in preparation for visit and have obtained necessary documentation.  Chief Complaint   Patient presents with    Depression       Feels better with Buspar meds and has some nausea with am medication     Health Maintenance Due   Topic    HIV screen     Hepatitis C screen     Hepatitis B vaccine (1 of 3 - 19+ 3-dose series)    DTaP/Tdap/Td vaccine (1 - Tdap)    Breast cancer screen     Colorectal Cancer Screen     Shingles vaccine (1 of 2)    Flu vaccine (1)    COVID-19 Vaccine ( season)       Vitals:    24 1014   BP: 130/75   Site: Left Upper Arm   Position: Sitting   Cuff Size: Large Adult   Pulse: 61   Resp: 18   Temp: 98 °F (36.7 °C)   TempSrc: Temporal   SpO2: 95%   Weight: 94.8 kg (209 lb)   Height: 1.575 m (5' 2\")       Social Determinants Of Health:       SDOH screening not required at visit.  Resources Declined.   See AVS for attached resources, if requested.    Coordination of Care Questionnaire:       \"Have you been to the ER, urgent care clinic since your last visit?  Hospitalized since your last visit?\"    NO    “Have you seen or consulted any other [facility]?”    NO    Have you had a mammogram?”   NO    No breast cancer screening on file         “Have you had a colorectal cancer screening such as a colonoscopy/FIT/Cologuard?    NO    No colonoscopy on file  No cologuard on file  No FIT/FOBT on file   No flexible sigmoidoscopy on file         Click Here for Release of Records Request

## 2024-11-20 NOTE — PROGRESS NOTES
[facility] Medicine Residency     Subjective   Neda Canela is a 53 y.o. female who presents for Depression    #Anxiety:  History: Has tried SSRI/SNRI but this made her feel depressed and unable to leave her home. She also has history of seizures.      9/5  Increase Buspar 10mg bid to 15mg in qam and 10mg qhs as is needing her additional 5mg more than half the days in the week. Insurance not covering 15mg tablets so can do 10mg and 5mg tabs together     10/16  Increase Buspar from 15mg in qam and 10mg qhs to 30mg BID.   Pt also mentioned having hot flashes and we discussed the benefits of the addition of Gabapentin with Buspar for better anxiety control plus the added benefits of relieving hot flashes.      10/31   Anxiety more controlled since increasing to Buspar 30 mg BID. Panic attacks/\"moments\" decreased from 5 in a week to 2 \"moments\". Not required any Xanax. Has not made an apt with psychiatrist.     Today:  Decreased intensity of \"moments\".  Has been tolerating Buspar 60mg BID along with the Gabapentin 100mg qd which seems to be helping even more. Met with psychiatrist Via phone call yesterday at [facility], has in person appointment on 12/4. New therapist meet up on 12/9. Has still not used Xanax prn yet.         #Vasomotor symptoms due to menopause: Has IUD in at present. Has not tolerated SSRI/SNRI for anxiety in the past.Started Gabapentin 100mg qd on 10/31. Symptoms are now much better controlled.        Please note that this dictation was completed with [employer], the [employer] voice recognition software.  Quite often unanticipated grammatical, syntax, homophones, and other interpretive errors are inadvertently transcribed by the computer software.  Please disregard these errors.  Please excuse any errors that have escaped final proofreading.     Review of Systems   Review of Systems   Psychiatric/Behavioral:  Negative for agitation, self-injury and suicidal

## 2024-12-05 DIAGNOSIS — N95.1 VASOMOTOR SYMPTOMS DUE TO MENOPAUSE: ICD-10-CM

## 2024-12-05 DIAGNOSIS — F41.9 ANXIETY: ICD-10-CM

## 2024-12-05 RX ORDER — BUSPIRONE HYDROCHLORIDE 30 MG/1
30 TABLET ORAL 2 TIMES DAILY
Qty: 60 TABLET | Refills: 1 | Status: CANCELLED | OUTPATIENT
Start: 2024-12-05

## 2024-12-05 NOTE — TELEPHONE ENCOUNTER
Medication Refill Request    Neda Canela is requesting a refill of the following medication(s):   Requested Prescriptions     Pending Prescriptions Disp Refills    busPIRone (BUSPAR) 30 MG tablet 60 tablet 0     Sig: Take 30 mg by mouth in the morning and at bedtime    gabapentin (NEURONTIN) 100 MG capsule 30 capsule 0     Sig: Take 1 capsule by mouth daily for 30 days. Intended supply: 90 days Max Daily Amount: 100 mg        Listed PCP is Viktor Carlson MD   Last provider to prescribe medication: Robyn  Last Date of Medication Prescribed:  11/20/2024  Date of Last Office Visit at [facility]:  11/20/2024  FUTURE APPOINTMENT:  Future Appointments   Date Time Provider Department Center   1/27/2025  8:45 AM Aline Boykin MD [facility]       Please send refill to:    [facility] [id number] [place], [place] - [address] - P [phone] - F [phone]  [address]  [facility] [address]  Phone: [phone] Fax: [phone]      Please review request and approve or deny with recommendations.

## 2024-12-06 DIAGNOSIS — F41.9 ANXIETY: ICD-10-CM

## 2024-12-06 RX ORDER — BUSPIRONE HYDROCHLORIDE 30 MG/1
30 TABLET ORAL 2 TIMES DAILY
Qty: 60 TABLET | Refills: 1 | Status: SHIPPED | OUTPATIENT
Start: 2024-12-06

## 2024-12-06 RX ORDER — GABAPENTIN 100 MG/1
100 CAPSULE ORAL DAILY
Qty: 30 CAPSULE | Refills: 1 | Status: SHIPPED | OUTPATIENT
Start: 2024-12-06 | End: 2025-02-04

## 2025-02-18 DIAGNOSIS — F41.9 ANXIETY: ICD-10-CM

## 2025-02-19 NOTE — TELEPHONE ENCOUNTER
Medication Refill Request    Neda Canela is requesting a refill of the following medication(s):   Requested Prescriptions     Pending Prescriptions Disp Refills    busPIRone (BUSPAR) 30 MG tablet [Pharmacy Med Name: busPIRone HCL 30 MG TABLET] 60 tablet 1     Sig: TAKE 1 TABLET BY MOUTH 2 TIMES A DAY IN THE MORNING AND AT BEDTIME        Listed PCP is Viktor Carlson MD   Last provider to prescribe medication: Robyn  Last Date of Medication Prescribed:  12/06/2024  Date of Last Office Visit at [facility]:  11/20/2024  FUTURE APPOINTMENT:   Future Appointments   Date Time Provider Department Center   3/10/2025 11:45 AM Aline Boykin MD [facility]       Please send refill to:    [facility] [id number] - [place], [place] - [address] - P [phone] - F [phone]  [address]  [facility] [address]  Phone: [phone] Fax: [phone]      Please review request and approve or deny with recommendations.

## 2025-02-23 RX ORDER — BUSPIRONE HYDROCHLORIDE 30 MG/1
TABLET ORAL
Qty: 60 TABLET | Refills: 1 | Status: SHIPPED | OUTPATIENT
Start: 2025-02-23

## 2025-03-10 ENCOUNTER — OFFICE VISIT (OUTPATIENT)
Age: 55
End: 2025-03-10
Payer: COMMERCIAL

## 2025-03-10 VITALS
HEART RATE: 71 BPM | BODY MASS INDEX: 38.46 KG/M2 | SYSTOLIC BLOOD PRESSURE: 155 MMHG | RESPIRATION RATE: 16 BRPM | HEIGHT: 62 IN | OXYGEN SATURATION: 96 % | WEIGHT: 209 LBS | DIASTOLIC BLOOD PRESSURE: 79 MMHG

## 2025-03-10 DIAGNOSIS — G40.109 TEMPORAL LOBE EPILEPSY (HCC): Primary | ICD-10-CM

## 2025-03-10 PROCEDURE — 99214 OFFICE O/P EST MOD 30 MIN: CPT | Performed by: PSYCHIATRY & NEUROLOGY

## 2025-03-10 RX ORDER — ESLICARBAZEPINE ACETATE 600 MG/1
600 TABLET ORAL DAILY
Qty: 90 TABLET | Refills: 3 | Status: SHIPPED | OUTPATIENT
Start: 2025-03-10

## 2025-03-10 NOTE — PROGRESS NOTES
difficulty.  She will be getting  this May.    Last Seizure  March 2024     Previous antiseizure medications  Zonegran--side effects  Trileptal  Keppra     Current antiseizure medications  Aptiom 600 mg tablets--1 tablet daily  Nasal Valium as needed rescue     Epilepsy etiology  Left temporal lobe     Seizure types   Arms outstretched and jerking. She was unresponsive. She was cyanotic. Lasted less than 2 minutes.      Gibberish speech     Seizure risk factors  Family history of seizure/epilepsy--no  CNS infections--no  Head trauma with loss of consciousness--no  Febrile convulsions--no     Diagnostics  EEG in March 2024 demonstrated focal slowing over the left temporal region with left temporal sharp waves.      Neuropsychological evaluation November 17, 2023 with Dr. Lara finds the patient had weaknesses of auditory attention question ADHD versus auditory processing disorder as well as diagnoses of PTSD and persistent depressive disorder anxiety about health question somatization as well as anxiety.      MRI of the brain the [facility] dated May 27, 2022 unremarkable and no evidence for mesial temporal sclerosis, cortical dysplasia or other neuronal migrational abnormalities     EEG [facility] April 11, 2022 with slowing left temporal region     CT head [facility] April 2, 2022 unremarkable     Most recent laboratory analysis  June 4, 2024  Basic metabolic panel unremarkable and specifically sodium 139  Hemoglobin A1c 5.5  Lipid panel with an LDL of 169    Record review  Family medicine visit with Dr. Carlson dated November 20, 2024 where patient was presenting with depression and anxiety.  Drug trials were outlined.  She was continued on BuSpar and Neurontin.    Examination  BP (!) 155/79 (BP Site: Left Upper Arm, Patient Position: Sitting)   Pulse 71   Resp 16   Ht 1.575 m (5' 2\")   Wt 94.8 kg (209 lb)   SpO2 96%   BMI 38.23 kg/m²     Awake, alert and oriented.  No

## 2025-04-22 DIAGNOSIS — F41.9 ANXIETY: ICD-10-CM

## 2025-04-24 NOTE — TELEPHONE ENCOUNTER
Medication Refill Request    Neda Canela is requesting a refill of the following medication(s):   Requested Prescriptions     Pending Prescriptions Disp Refills    busPIRone (BUSPAR) 30 MG tablet [Pharmacy Med Name: busPIRone HCL 30 MG TABLET] 60 tablet 1     Sig: TAKE 1 TABLET BY MOUTH 2 TIMES A DAY IN THE MORNING AND AT BEDTIME        Listed PCP is Viktor Carlson MD      FUTURE APPOINTMENT:   Future Appointments   Date Time Provider Department Center   11/10/2025 11:30 AM Aline Boykin MD [facility]       Please send refill to:    [facility] [id number] [place], [place] - [address] [facility] - P [phone] - F [phone]  [address] [facility] [address]  Phone: [phone] Fax: [phone]      Please review request and approve or deny with recommendations.

## 2025-04-28 RX ORDER — BUSPIRONE HYDROCHLORIDE 30 MG/1
TABLET ORAL
Qty: 60 TABLET | Refills: 2 | Status: SHIPPED | OUTPATIENT
Start: 2025-04-28

## 2025-07-29 DIAGNOSIS — F41.9 ANXIETY: ICD-10-CM

## 2025-07-29 RX ORDER — BUSPIRONE HYDROCHLORIDE 30 MG/1
TABLET ORAL
Qty: 60 TABLET | Refills: 2 | Status: SHIPPED | OUTPATIENT
Start: 2025-07-29

## 2025-07-29 NOTE — TELEPHONE ENCOUNTER
Medication Refill Request    Neda Canela is requesting a refill of the following medication(s):   Requested Prescriptions     Pending Prescriptions Disp Refills    busPIRone (BUSPAR) 30 MG tablet [Pharmacy Med Name: busPIRone HCL 30 MG TABLET] 60 tablet 2     Sig: TAKE 1 TABLET BY MOUTH 2 TIMES A DAY (IN THE MORNING AND AT BEDTIME)        Listed PCP is Viktor Carlson MD   Last provider to prescribe medication: Robyn  Last Date of Medication Prescribed:  04/28/2025  Date of Last Office Visit at [facility]:  11/20/2024  FUTURE APPOINTMENT:   Future Appointments   Date Time Provider Department Center   11/10/2025 11:30 AM Aline Boykin MD [facility]       Please send refill to:    [facility] [id number] - [place], [place] - [address] - P [phone] - F [phone]  [address]  [facility] [address]  Phone: [phone] Fax: [phone]      Please review request and approve or deny with recommendations.